# Patient Record
Sex: FEMALE | Race: WHITE
[De-identification: names, ages, dates, MRNs, and addresses within clinical notes are randomized per-mention and may not be internally consistent; named-entity substitution may affect disease eponyms.]

---

## 2017-10-25 ENCOUNTER — HOSPITAL ENCOUNTER (INPATIENT)
Dept: HOSPITAL 92 - ERS | Age: 82
LOS: 3 days | Discharge: NURSING, REHAB OR LONG-TERM CARE | DRG: 956 | End: 2017-10-28
Attending: SURGERY | Admitting: SURGERY
Payer: MEDICARE

## 2017-10-25 VITALS — BODY MASS INDEX: 19.5 KG/M2

## 2017-10-25 DIAGNOSIS — E87.1: ICD-10-CM

## 2017-10-25 DIAGNOSIS — S06.6X9A: ICD-10-CM

## 2017-10-25 DIAGNOSIS — I10: ICD-10-CM

## 2017-10-25 DIAGNOSIS — S72.012A: Primary | ICD-10-CM

## 2017-10-25 DIAGNOSIS — G30.9: ICD-10-CM

## 2017-10-25 DIAGNOSIS — F32.9: ICD-10-CM

## 2017-10-25 DIAGNOSIS — Z88.2: ICD-10-CM
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
## 2017-10-25 DIAGNOSIS — D62: ICD-10-CM

## 2017-10-25 DIAGNOSIS — Z86.73: ICD-10-CM

## 2017-10-25 DIAGNOSIS — G20: ICD-10-CM

## 2017-10-25 DIAGNOSIS — S12.600A: ICD-10-CM

## 2017-10-25 DIAGNOSIS — F02.80: ICD-10-CM

## 2017-10-25 DIAGNOSIS — Z88.8: ICD-10-CM

## 2017-10-25 DIAGNOSIS — Z88.0: ICD-10-CM
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
## 2017-10-25 LAB
ALP SERPL-CCNC: 70 U/L (ref 40–150)
ALT SERPL W P-5'-P-CCNC: 10 U/L (ref 8–55)
ANION GAP SERPL CALC-SCNC: 12 MMOL/L (ref 10–20)
APTT PPP: 28.8 SEC (ref 22.9–36.1)
AST SERPL-CCNC: 14 U/L (ref 5–34)
BASOPHILS # BLD AUTO: 0.1 THOU/UL (ref 0–0.2)
BASOPHILS NFR BLD AUTO: 0.8 % (ref 0–1)
BILIRUB SERPL-MCNC: 0.6 MG/DL (ref 0.2–1.2)
BUN SERPL-MCNC: 11 MG/DL (ref 9.8–20.1)
CALCIUM SERPL-MCNC: 9.2 MG/DL (ref 7.8–10.44)
CHLORIDE SERPL-SCNC: 99 MMOL/L (ref 98–107)
CK SERPL-CCNC: 32 U/L (ref 29–168)
CO2 SERPL-SCNC: 25 MMOL/L (ref 23–31)
CREAT CL PREDICTED SERPL C-G-VRATE: 0 ML/MIN (ref 70–130)
EOSINOPHIL # BLD AUTO: 0.2 THOU/UL (ref 0–0.7)
EOSINOPHIL NFR BLD AUTO: 2.6 % (ref 0–10)
GLOBULIN SER CALC-MCNC: 3.1 G/DL (ref 2.4–3.5)
HCT VFR BLD CALC: 38.4 % (ref 36–47)
LIPASE SERPL-CCNC: 7 U/L (ref 8–78)
LYMPHOCYTES # BLD: 1.1 THOU/UL (ref 1.2–3.4)
LYMPHOCYTES NFR BLD AUTO: 14.4 % (ref 21–51)
MONOCYTES # BLD AUTO: 0.6 THOU/UL (ref 0.11–0.59)
MONOCYTES NFR BLD AUTO: 8.4 % (ref 0–10)
NEUTROPHILS # BLD AUTO: 5.6 THOU/UL (ref 1.4–6.5)
PROTHROMBIN TIME: 13.7 SEC (ref 12–14.7)
RBC # BLD AUTO: 4.33 MILL/UL (ref 4.2–5.4)
TROPONIN I SERPL DL<=0.01 NG/ML-MCNC: (no result) NG/ML (ref ?–0.03)
WBC # BLD AUTO: 7.6 THOU/UL (ref 4.8–10.8)

## 2017-10-25 PROCEDURE — 85025 COMPLETE CBC W/AUTO DIFF WBC: CPT

## 2017-10-25 PROCEDURE — 36415 COLL VENOUS BLD VENIPUNCTURE: CPT

## 2017-10-25 PROCEDURE — 96375 TX/PRO/DX INJ NEW DRUG ADDON: CPT

## 2017-10-25 PROCEDURE — 70450 CT HEAD/BRAIN W/O DYE: CPT

## 2017-10-25 PROCEDURE — 84100 ASSAY OF PHOSPHORUS: CPT

## 2017-10-25 PROCEDURE — G0390 TRAUMA RESPONS W/HOSP CRITI: HCPCS

## 2017-10-25 PROCEDURE — 71010: CPT

## 2017-10-25 PROCEDURE — 84484 ASSAY OF TROPONIN QUANT: CPT

## 2017-10-25 PROCEDURE — 82553 CREATINE MB FRACTION: CPT

## 2017-10-25 PROCEDURE — 83735 ASSAY OF MAGNESIUM: CPT

## 2017-10-25 PROCEDURE — 85610 PROTHROMBIN TIME: CPT

## 2017-10-25 PROCEDURE — 81003 URINALYSIS AUTO W/O SCOPE: CPT

## 2017-10-25 PROCEDURE — 80048 BASIC METABOLIC PNL TOTAL CA: CPT

## 2017-10-25 PROCEDURE — 0SRS0JA REPLACEMENT OF LEFT HIP JOINT, FEMORAL SURFACE WITH SYNTHETIC SUBSTITUTE, UNCEMENTED, OPEN APPROACH: ICD-10-PCS | Performed by: ORTHOPAEDIC SURGERY

## 2017-10-25 PROCEDURE — 72125 CT NECK SPINE W/O DYE: CPT

## 2017-10-25 PROCEDURE — 85730 THROMBOPLASTIN TIME PARTIAL: CPT

## 2017-10-25 PROCEDURE — 80053 COMPREHEN METABOLIC PANEL: CPT

## 2017-10-25 PROCEDURE — 93970 EXTREMITY STUDY: CPT

## 2017-10-25 PROCEDURE — 83690 ASSAY OF LIPASE: CPT

## 2017-10-25 PROCEDURE — 96374 THER/PROPH/DIAG INJ IV PUSH: CPT

## 2017-10-25 RX ADMIN — CLINDAMYCIN PHOSPHATE SCH MLS: 600 INJECTION, SOLUTION INTRAVENOUS at 22:12

## 2017-10-25 RX ADMIN — TROSPIUM CHLORIDE SCH: 20 TABLET, FILM COATED ORAL at 22:12

## 2017-10-25 NOTE — CON
DATE OF CONSULTATION:  10/25/2017

 

REASON FOR CONSULTATION:  Left traumatic subarachnoid hemorrhage and C7 cervical fracture.

 

HISTORY OF PRESENT ILLNESS:  Ms. Rubin is an 83-year-old female who I saw in her room this morning
.  She was found down at her skilled nursing facility in particular it was [place] at [facility]
.  The first night in the facility she fell.  The story is unclear of how she fell; however, the sta
ff found her on the floor and she was in pain and complaining of her right hip hurting.  When she ar
rived at [facility] Emergency Department on a CT of the cervical spine she sustained a C3 v
ertebral body, nondisplaced fracture and a small subarachnoid hemorrhage in the left frontal tempora
l region.  Neurosurgery has been consulted for the small traumatic subarachnoid hemorrhage and C7 no
ndisplaced fracture of the cervical spine.

 

PAST MEDICAL HISTORY:  Parkinson's with parkinsonian dementia and hypertension.  She sees Dr. Valeria Jacome. 

 

PAST SURGICAL HISTORY:  Noncontributory.

 

MEDICATIONS:  Please see medication reconciliation form.  Patient will need to refrain from using an
y blood thinners for approximately 4 weeks postoperatively from the hip surgery and from a neurosurg
ical standpoint with traumatic subarachnoid hemorrhage.

 

REVIEW OF SYSTEMS:  The patient's daughter is at bedside and although she is slightly hypertensive a
t 175/76 she admits to having hypertension that is controlled.  Negative review of systems for any o
ther cardiovascular disease.  The patient also admits to Parkinson's dementia in which she is being 
treated by Dr. Valeria Jacome in Neurology.  All other review of systems are negative, unless stated in 
the above HPI.

 

SOCIAL HISTORY:  The patient denies alcohol, tobacco or illicit drug use.  She is a resident of [facility] that requires total care.  She does ambulate with assistance and essentially hous
ehold walker.

 

PHYSICAL EXAMINATION:

HEENT:  Normocephalic, atraumatic.  Hearing intact.  Moist mucous membranes.  Trachea midline.

EYES:  Pupils are equal and reactive to light.  Extraocular muscles are intact.  Sclerae is white, n
onicteric.

CARDIOVASCULAR:  The patient has a regular rate and rhythm, normal S1, S2 heart sounds, has  no dist
al cyanosis or clubbing noted.

RESPIRATORY:  Patient has bilateral symmetric chest rise.  Appears to be in no shortness of breath. 
 All lung fields are clear.

NEUROLOGIC:  Cranial nerves II-XII are grossly intact.  Her speech is fluent and she answers my ques
tions appropriately.  She is slightly confused; however, this is baseline per family.

MUSCULOSKELETAL:  The patient has tenderness to palpation of the right hip and demonstrates shorteni
ng and external rotation of the left lower extremity compared to the right.  She is neurovascularly 
intact.

 

IMAGING/STUDIES:  Mentioned in the HPI.

 

IMPRESSION:  Ms. Rubin is an 83-year-old female who is in 3323 who presented with a left displaced
 shortened femoral neck fracture, a C7 nondisplaced stable vertebral fracture and a small traumatic 
subarachnoid hemorrhage with advanced Parkinson's and hypertension.

 

PLAN:  From neurosurgical standpoint we will have patient's head of bed at 30 degrees.  We will do n
euro checks q.4 hours.  We will keep the patient off of blood thinners for approximately 4 weeks and
 get regular ultrasounds of bilateral lower extremities to ensure that she does not have a DVT.  If 
DVT does develop at some point during her hospitalization the patient will need a filter placed.

 

If there are any further questions, please feel free to contact Neurosurgery.

## 2017-10-25 NOTE — CT
PRELIMINARY REPORT/[facility]/EMERGENCY AFTER

HOURS PROCEDURE:

 

EXAM:

CT Cervical Spine Without Intravenous Contrast

 

EXAM DATE/TIME:

Exam ordered 10/25/2017 5:50 AM

 

CLINICAL HISTORY:

83 years old, female; Injury or trauma; Fall; Initial encounter; Abrasion

 

TECHNIQUE:

Axial computed tomography images of the cervical spine without intravenous contrast.

Coronal and sagittal reformatted images were created and reviewed.

 

COMPARISON:

No relevant prior studies available.

 

FINDINGS:

Vertebrae: No acute cervical spine fracture is identified.

Discs/spinal canal/neural foramina: The cervical spine demonstrates mild degenerative changes at mul
tiple levels. No spinal canal stenosis.

Soft tissues: Normal.

Thyroid: The thyroid gland is normal.

Lung apices: The visualized portions of the lung apices are normal.

 

IMPRESSION:

No acute cervical spine fracture is identified.

 

Thank you for allowing us to participate in the care of your patient.

 

Dictated and Authenticated by: Albin Senior MD

10/25/2017 6:14 AM Central Time ([place] \T\ Ayesha)

 

 

                           FINAL REPORT

 

CERVICAL SPINE CT WITHOUT CONTRAST:

 

HISTORY:

An 83-year-old female.  Fall.  Injury.  Pain.

 

COMPARISON:

09/03/2017

 

TECHNIQUE:

A cervical spine CT is performed without contrast.  Reformatted images are submitted for interpretat
ion.

 

FINDINGS:

This report is in disagreement with the preliminary report by [facility].  When compared to the prior exami
nation, there is interval loss of vertebral body height at T1 and T2.  Additionally, there is a subt
le lucency involving the C7 vertebral body, suggesting a nondisplaced fracture.  Clinical correlatio
n and further evaluation with MRI is recommended.

 

IMPRESSION:

1.  Nondisplaced C7 fracture.

2.  Mild loss of vertebral body height at T1 and T2 since the prior exam, correlate for fracture.  C
onsider MRI of the C spine and include upper T spine.

 

The results of the study were discussed with Dr. Henderson on 10/25/2017 at 8:18 a.m.

 

CODE CR

 

POS: [facility]

## 2017-10-25 NOTE — RAD
RADIOGRAPH LEFT HIP TWO VIEWS:

 

Date: 10-25-17 

Time: 5:30 a.m.

 

History: 

83-year-old female status post fall, left hip pain. 

 

FINDINGS: 

There is a transverse fracture of the subcapital portion of the femoral neck, with at least half bon
e width superolateral displacement of the distal fragment, and at least mild overlap of fracture fra
gments. Diffuse osteopenia. No dislocation. 

 

IMPRESSION: 

Acute, traumatic, displaced, closed, subcapital left femoral neck fracture. 

 

POS: [facility]

## 2017-10-25 NOTE — RAD
AP AND FROGLEG VIEWS LEFT HIP

10/25/17

 

HISTORY: 

Status post left femoral neck fracture post hip replacement. 

 

AP and frogleg views left hip is obtained. 

 

FINDINGS/IMPRESSION:  

A left hip unipolar arthroplasty is seen. The fractured femoral head and portions of the femoral nec
k have been removed. Left hip arthroplasty has been placed. No acute fractures or bony lesions seen.
 

 

POS: [facility]

## 2017-10-25 NOTE — CON
DATE OF CONSULTATION:  10/25/2017

 

REQUESTING PHYSICIAN:  Dr. Jhonatan Morrison.

 

CONSULTING PHYSICIAN:  Dr. Joe Degroot.

 

REASON FOR CONSULTATION:  Left hip femoral neck fracture.

 

BRIEF CLINICAL HISTORY:  Marie is an 83-year-old white female who was brought to [facility] via EMS, after she was found down at her skilled nursing facility.  She just recently 
transferred from [place] to [facility]  This was her first night in this particular facility.  
Of note, she also sustained a C7 vertebral body nondisplaced fracture and a small subarachnoid hemor
rhage as a result of the fall.  Our service has been consulted for the left hip fracture and definit
shahana orthopedic management thereof.

 

PAST MEDICAL HISTORY:  Significant for advanced Parkinson's with parkinsonian dementia and hypertens
ion.

 

PAST SURGICAL HISTORY:  Noncontributory.

 

MEDICATIONS:  Please see medication reconciliation form.

 

REVIEW OF SYSTEMS:  The patient's daughter is at the bedside and she gives a very detailed history o
f the patient's medical history to include her Parkinson's dementia and her physical inability; quach
fransisco, other than that, she has a little high blood pressure and review of systems is negative for any
 other significant cardiovascular disease or illness.

 

SOCIAL HISTORY:  There is no ethanol, tobacco, or illicit drug abuse.  The patient is a nursing home
 resident and requires total care.  She does ambulate with assistance and is essentially a household
 walker.

 

PHYSICAL EXAMINATION:

GENERAL:  This is an elderly white female, appearing her stated age.  She is frail.  Does not respon
d much for the examiner, but does converse with her daughters.

VITAL SIGNS:  Stable.  Grossly nonfocal.

MUSCULOSKELETAL:  Visual inspection of the left lower extremity demonstrates her to have shortening 
in external rotation of the left lower extremity relative to the right.  She is neurovascularly inta
ct on this, and exam of the hips not performed due to known underlying displaced shortened femoral n
ananya fracture.

 

IMAGING STUDIES:  Two-view left hip demonstrates a shortened, displaced, midcervical femoral neck fr
acture.  CT examination of the cervical spine demonstrates a stable middle column involvement of the
 C7 vertebra without displacement or retropulsion.

 

IMPRESSION:

1.  Left hip displaced shortened femoral neck fracture.

2.  C7 nondisplaced stable vertebral fracture.

3.  Small traumatic subarachnoid hemorrhage.

4.  Advanced Parkinson's with dementia.

5.  Hypertension.

 

PLAN:

1.  The patient will be admitted to the trauma team.

2.  The risks, benefits, options, alternatives, and rationale for proceeding with a press-fit left h
ip hemiarthroplasty has been explained in great detail to the patient and her family, who accompanie
s her and they are ready to proceed.  All questions were answered and no guarantee of outcome has be
en stated or implied.

3.  Please see orders.

## 2017-10-25 NOTE — HP-2
DATE OF SERVICE:  10/25/2017

 

CHIEF COMPLAINT:  Fall from ground level.

 

HISTORY OF PRESENT ILLNESS:  This is an 83-year-old  female who was 
brought to [facility] via EMS after she was found down 
at her skilled nursing facility.  She just recently transferred from [place] 
to [facility].  This was her first night in this particular facility.  The 
patient reports that she remembers the fall, but the fall was unwitnessed and 
she is a poor historian as she has Parkinsonism with dementia.  She does 
endorse pain above the eye on the left side as well as left hip pain.  The 
patient's daughter was at bedside and assisted with providing information 
regarding the patient's past medical history.  Of note, not a lot of 
information is known about this fall as it was unwitnessed.  EMS was called 
immediately after the patient was found down at her facility.  A hip x-ray, 
chest x-ray, cervical spine CT and brain CT were performed.  The patient was 
also given morphine 2 mg for pain relief.

 

PAST MEDICAL HISTORY:

1.  Parkinson's disease.

2.  Parkinsonian dementia.

3.  Hypertension.

4.  History of transient ischemic attacks.

5.  Depression.

 

PAST SURGICAL HISTORY:  Not available at the time of evaluation. Will  try to 
obtain this information from the family if possible.

 

ALLERGIES:

1.  HYDRALAZINE.

2.  PENICILLIN G SODIUM.

3.  PENICILLIN.

4.  SULFA.

5.  SULFAMETHOXAZOLE. 

 

MEDICATIONS:

1.  Seroquel 50 mg oral daily.

2.  Centrum Silver 1 tablet oral once a day.

3.  Aspirin 81 mg oral daily.

4.  Detrol 1 tablet oral once a day.

5.  Sinemet 2 tablets oral 3 times a day.

6.  Lisinopril 1 tablet oral once a day.

7.  Celexa 1 tablet oral once a day.

8.  Neupro 1 transdermal patch q.24h.

9.  Tylenol extra strength 500 mg oral q.6h. p.r.n.

 

FAMILY HISTORY:  Noncontributory.

 

SOCIAL HISTORY:  There is no alcohol, tobacco or illicit drug abuse per family.
  The patient is a nursing home resident and requires total care.  She does 
ambulate with assistance.  

 

REVIEW OF SYSTEMS:  The patient is unable to provide a reliable review of 
systems secondary to her mental status.  The patient's daughter was at bedside 
and able to tell us that the patient has Parkinsonian dementia.  The patient 
did report that she had some pain above her left eye as well as left hip pain.  
Other than the above-mentioned findings review of systems was insignificant. 

 

PHYSICAL EXAMINATION:

VITAL SIGNS:  Blood pressure 175/76, pulse 83, respiratory rate 18, temperature 
98.3, oxygen saturation 95% on 2 liters of oxygen.

GENERAL:  The patient is alert, in no acute distress.  She is well developed 
and appropriately interactive.  She will answer questions for examiner.

HEENT:  Eyes; pupils equally round, reactive to light and accommodation.  
Extraocular muscles intact.  Conjunctivae within normal limits.  

NECK:  Supple.

CARDIOVASCULAR:  Irregularly irregular rhythm.  No murmur detected.  Radial 
pulses 2+.

RESPIRATORY:  Normal respiratory effort, equal rise and fall of the chest.

LUNGS:  Clear to auscultation.

ABDOMEN:  Soft, nontender to palpation.  Bowel sounds positive in all 4 
quadrants.

EXTREMITIES:  Patient tender to palpation over the left hip.

NEUROLOGIC:  Patient neurovascularly intact on this exam.  GCS 15.

 

LABORATORY DATA:  

1.  CBC:  White blood cell count 10.6, hemoglobin 12.4, hematocrit 38.4, 
platelet 247.

2.  CMP:  Sodium 132, potassium 4.1, chloride 99, bicarb 25, BUN 11, creatinine 
0.62, calcium 9.2, total bilirubin 0.6, AST 14, ALT 10, alkaline phosphatase 70 
with a total protein 6.8, albumin 3.7.

3.  Creatinine kinase 13.

4.  CK-MB 1.7, troponin less than 0.01.

5.  Coag studies:  PT 32.7, INR 1.0, PTT 28.8.

6.  Urine negative.

7.  Brain CT; acute left frontal subarachnoid hemorrhage.

8.  Cervical spine CT:  C7 vertebral body fracture identified.  

9.  Chest x-ray:  Cardiomegaly.

10.  Hip x-ray acute traumatic displaced closed subcapital left femoral neck 
fracture.

 

ASSESSMENT AND PLAN:  This 83-year-old female status post ground level fall.

1.  Status post ground level fall.

2.  C7 vertebral body fracture.

3.  Acute traumatic displaced closed subcapital left femoral neck fracture.

4.  Subarachnoid hemorrhage.

 

PLAN:  The  patient has been admitted to the Trauma Service.  The patient will 
be placed on surgical floor as inpatient.  GREGORY Hurtado has been consulted.  
The patient is likely to undergo surgical procedure for left hip fracture this 
afternoon.  The patient was placed in a neck brace for a C7 vertebral body 
fracture.  We will continue to follow patient. 

 

Dr. Morrison saw and evaluated patient.

 

[facility]

## 2017-10-25 NOTE — CT
PRELIMINARY REPORT/[facility]/EMERGENCY AFTER

HOURS PROCEDURE:

 

EXAM:

CT Head Without Intravenous Contrast

 

EXAM DATE/TIME:

Exam ordered 10/25/2017 5:53 AM

 

CLINICAL HISTORY:

83 years old, female; Injury or trauma; Fall; Initial encounter; Abrasion; Not specified; Patient HX
: Er 8; Fall; F83 presented to ed by ems C/O l hip pain S/P mechanical fall due to failure with pt's
 walker. Ems arrived on scene and found pt laying on her l side after the fall. Pt denies loc. Ems n
otes pt has not had any of her medications today. Pt HX of dementia.

 

TECHNIQUE:

Axial computed tomography images of the head/brain without intravenous contrast.

 

COMPARISON:

CT head from 9/3/17.

 

FINDINGS:

Brain: There is hyperattenuation involving the sulci of the LEFT frontal lobe suggestive of subarach
noid hemorrhage. No significant white matter disease.

Ventricles: Normal. No ventriculomegaly.

Bones/joints: Normal. No acute fracture.

Soft tissues: Normal.

Sinuses: Unremarkable as visualized. No acute sinusitis.

Mastoid air cells: Unremarkable as visualized. No mastoid effusion.

 

IMPRESSION:

Acute LEFT frontal subarachnoid hemorrhage as above.

 

THIS REPORT CONTAINS FINDINGS THAT MAY BE CRITICAL TO PATIENT CARE. The findings were verbally commu
nicated via telephone conference with Dr. Henderson at 6:27 AM CDT on 10/25/2017. The findings were ackno
wledged and understood.

 

Thank you for allowing us to participate in the care of your patient.

 

Dictated and Authenticated by: Albin Senior MD

10/25/2017 6:28 AM Central Time ([place] \T\ Ayesha)

 

 

                           FINAL REPORT

 

CT BRAIN WITHOUT CONTRAST

 

I agree with the preliminary report given by Dr. Albin Senior of [facility]. 

 

POS: [facility]

## 2017-10-25 NOTE — ULT
HISTORY: 

Immobility. Patient with right femoral fracture as well as hip surgery today involving the left hip.
 

 

BILATERAL LOWER EXTREMITY VENOUS DOPPLER ULTRASOUND EVALUATION

10/25/17

 

Multiple longitudinal and transverse images of the right and left lower extremity venous systems wer
e obtained using a multihertz linear array transducer. Real time, color flow, and spectral waveform 
doppler analysis used to evaluate the right and left lower extremity venous systems. 

 

Images demonstrate no evidence of acute or old clot seen in the right or left common femoral, superf
icial femoral, femora profunda, popliteal, posterior tibial veins, post trifurcation veins, and grea
ter saphenous veins. 

 

IMPRESSION:  

No evidence of right or left lower extremity deep venous thrombosis. 

 

POS: [facility]

## 2017-10-25 NOTE — RAD
PORTABLE CHEST ONE VIEW:

 

Date: 10-25-17 

Time: 5:31 a.m.

 

History: Fall, left hip pain. Dysmnesia. Hypertension. 

 

FINDINGS:

The heart size is enlarged. The aorta is tortuous. The lungs are well expanded without confluent are
as of consolidation, pneumothorax, becky pulmonary edema or pleural effusions. 

 

IMPRESSION: 

Cardiomegaly. 

 

POS: [facility]

## 2017-10-26 LAB
ANION GAP SERPL CALC-SCNC: 11 MMOL/L (ref 10–20)
ANION GAP SERPL CALC-SCNC: 9 MMOL/L (ref 10–20)
BASOPHILS # BLD AUTO: 0 THOU/UL (ref 0–0.2)
BASOPHILS NFR BLD AUTO: 0.4 % (ref 0–1)
BUN SERPL-MCNC: 15 MG/DL (ref 9.8–20.1)
BUN SERPL-MCNC: 17 MG/DL (ref 9.8–20.1)
CALCIUM SERPL-MCNC: 8.4 MG/DL (ref 7.8–10.44)
CALCIUM SERPL-MCNC: 8.5 MG/DL (ref 7.8–10.44)
CHLORIDE SERPL-SCNC: 92 MMOL/L (ref 98–107)
CHLORIDE SERPL-SCNC: 93 MMOL/L (ref 98–107)
CO2 SERPL-SCNC: 24 MMOL/L (ref 23–31)
CO2 SERPL-SCNC: 27 MMOL/L (ref 23–31)
CREAT CL PREDICTED SERPL C-G-VRATE: 36 ML/MIN (ref 70–130)
CREAT CL PREDICTED SERPL C-G-VRATE: 37 ML/MIN (ref 70–130)
EOSINOPHIL # BLD AUTO: 0 THOU/UL (ref 0–0.7)
EOSINOPHIL NFR BLD AUTO: 0.5 % (ref 0–10)
HCT VFR BLD CALC: 32.2 % (ref 36–47)
LYMPHOCYTES # BLD: 0.6 THOU/UL (ref 1.2–3.4)
LYMPHOCYTES NFR BLD AUTO: 9.3 % (ref 21–51)
MONOCYTES # BLD AUTO: 0.5 THOU/UL (ref 0.11–0.59)
MONOCYTES NFR BLD AUTO: 8.1 % (ref 0–10)
NEUTROPHILS # BLD AUTO: 5.3 THOU/UL (ref 1.4–6.5)
RBC # BLD AUTO: 3.59 MILL/UL (ref 4.2–5.4)
WBC # BLD AUTO: 6.5 THOU/UL (ref 4.8–10.8)

## 2017-10-26 RX ADMIN — CLINDAMYCIN PHOSPHATE SCH MLS: 600 INJECTION, SOLUTION INTRAVENOUS at 05:06

## 2017-10-26 RX ADMIN — CLINDAMYCIN PHOSPHATE SCH MLS: 600 INJECTION, SOLUTION INTRAVENOUS at 15:02

## 2017-10-26 RX ADMIN — Medication SCH: at 09:25

## 2017-10-26 RX ADMIN — TROSPIUM CHLORIDE SCH MG: 20 TABLET, FILM COATED ORAL at 21:27

## 2017-10-26 RX ADMIN — TROSPIUM CHLORIDE SCH: 20 TABLET, FILM COATED ORAL at 12:07

## 2017-10-26 NOTE — PRG
DATE OF SERVICE:  10/26/2017

 

SUBJECTIVE:  The patient is an 83-year-old female who is postop day #1 from a left hip hemiarthropla
sty.  She has been sleeping quite a bit today according to her daughters who are at the bedside.

 

OBJECTIVE:

VITAL SIGNS:  Temperature 97.7, pulse 62, respiratory rate 16, O2 saturation on room air is 94%.  Bl
ood pressure is 137/71.

GENERAL:  Currently she is drowsy and resting comfortably.  

EXTREMITIES:  Incision is clean and closed without erythema, no strike through.  

 

LABORATORY:  Hemoglobin and hematocrit 10.5 and 32.2.

 

ASSESSMENT:

1.  An 83-year-old white female postop day #1 left hip hemiarthroplasty secondary to femoral neck fr
acture.

2.  Mild postoperative hemorrhagic anemia.

3.  Advanced Parkinson's with dementia.

4.  Hypertension.

5.  Subarachnoid hemorrhage secondary to fall.

 

PLAN:  Continue current management, mechanical DVT prophylaxis.

## 2017-10-26 NOTE — CT
PRELIMINARY REPORT/[facility]/EMERGENCY AFTER

HOURS PROCEDURE:

 

EXAM:

CT Head Without Intravenous Contrast

 

EXAM DATE/TIME:

Exam ordered 10/26/2017 4:42 AM

 

CLINICAL HISTORY:

83 years old, female; Condition or disease; Other: Sah; Patient HX: F/u sah

 

TECHNIQUE:

Axial computed tomography images of the head/brain without intravenous contrast.

 

COMPARISON:

CT Brain WO Con 2017-10-25 05:53

 

FINDINGS:

Brain: Redemonstrated is hyperdensity overlying sulci of the LEFT frontal lobe consistent with subar
achnoid hemorrhage. This finding is stable from one day prior. There is no significant mass effect o
r midline shift. No significant white matter disease.

Ventricles: Normal. No ventriculomegaly.

Bones/joints: Normal. No acute fracture.

Soft tissues: Normal.

Sinuses: Unremarkable as visualized. No acute sinusitis.

Mastoid air cells: Unremarkable as visualized. No mastoid effusion.

 

IMPRESSION:

Stable LEFT frontal subarachnoid hemorrhage.

 

Thank you for allowing us to participate in the care of your patient.

 

Dictated and Authenticated by: Albin Senior MD

10/26/2017 5:10 AM Central Time ([place] \T\ [place]yes[place])

 

 

FINAL REPORT

CT BRAIN WITHOUT CONTRAST:

 

Date:  10/26/17 

 

FINDINGS/IMPRESSION: 

I agree with the preliminary report given by Dr. Albin Senior of [facility]. 

 

 

POS: [facility]

## 2017-10-26 NOTE — PRG
DATE OF SERVICE:  10/26/2017

 

Ms. Rubin is an 83-year-old female who I saw in her room this morning.  She had a left-sided front
oparietal traumatic subarachnoid hemorrhage that is very small yesterday.  Repeat imaging of the bra
in this morning showed that it did not blossom in size.  Her vital signs have been stable overnight 
and there are no acute new neurologic deficits on exam.  She still has a left femoral neck fracture 
that Orthopedic Surgery is caring for.  From a neurological standpoint, it is doubtful that the suba
rachnoid hemorrhage will blossom in the future and thus far it has begun to resorb.  

 

Please contact Neurosurgery if there are any further questions.

## 2017-10-26 NOTE — PRG
DATE OF SERVICE:  10-26/2017

 

I personally interviewed and examined the patient and reviewed images and agree with documentation malinda Jeffrey PA-C, dated 10/25/2017.

 

Briefly Ms. Marie Rubin is an 83-year-old woman, the mother-in-law of one of our emergency room doc
tors, who fell yesterday.  She suffered a hip fracture.  She struck her head on the left forehead an
d a CT showed a small amount of subarachnoid blood and there was a suggestion on 1 plane of the cerv
ical spine CT scan that C7 may have a fracture in it.

 

Overnight, Ms. Rubin has rested comfortably in her hospital bed.  She is not complaining of neck p
ain to me this morning.  She understands may speech, but only if I speak very slowly and loudly.  Th
en, she follows commands quite well.  She tells me that her brain feels a little fuzzy from the pain
 medicine that she is getting.  On examination, I think she has good cranial nerve function.  I thin
k she has reasonable strength in her upper extremities, the lower extremity examination is complicat
ed by her hip fracture, but otherwise she is neurologically intact there as well.  I reviewed CT exa
minations of the brain showing traumatic subarachnoid hemorrhage in one of the sulci of the left fro
ntal lobe which has improved in a followup scan.  I reviewed the CT scan of the cervical spine on th
e axial views (which are not axial given the patient's accentuated thoracic kyphosis and compensator
y exaggerated cervical lordosis).  I see this only in 1 plane and she is completely nontender in the
 cervical spine.  I am not convinced that this is a fracture at all.  The safest thing to do would b
e to wear a collar for a couple weeks and have her come back to clinic, but she is already out of he
r collar.  She has normal range of motion and is without pain and the case could be made for leaving
 the collar off.  The safest thing to do would be to get an MRI scan and make sure there is no stir 
signal change within the body of C7 vertebral segment and if there is not,  then I will all be reass
ured that there is no fracture whatsoever.  I think the likelihood of a fracture is low.

 

Ms. Rubin has some traumatic subarachnoid hemorrhage.  I think she should not use blood thinners f
or management of her hip, but the ultrasound of the lower extremities should be considered or a temp
orary placement of an IVC filter.

 

We will follow her case in the hospital.

## 2017-10-26 NOTE — OP
DATE OF SURGERY:  10/25/2017

 

PREOPERATIVE DIAGNOSIS:  Left femoral neck fracture, displaced.

 

POSTOPERATIVE DIAGNOSIS:  Left femoral neck fracture, displaced.

 

SURGICAL PROCEDURE:  Left hip hemiarthroplasty.

 

ANESTHESIA:  General.

 

SURGEON:  Joe Degroot M.D.

 

ASSISTANT:  Bhupinder Cruz PA-C

 

IMPLANTS:  DePuy Craven system was used with a size 4 stem, a 42 mm diameter bipolar head and +1.5 n
ananya and head component.

 

ESTIMATED BLOOD LOSS:  200 mL.

 

COMPLICATIONS:  None.

 

DRAINS:  None.

 

SPECIMEN:  None.

 

OUTCOME:  Satisfactory.

 

INDICATIONS:  Ms. Rubin is a pleasant 83-year-old lady who has a history of Parkinson's disease.  
She was just recently discharged from a skilled nursing facility at [place] and unfortunately upon
 transfer to an assisted living, she sustained a ground level fall.  X-rays in the emergency room de
monstrated a displaced femoral neck fracture and as such admitted to the Trauma Service and orthoped
ic consultation requested.  Today, I have discussed with family and patient, the nature of the injur
y as well as treatment options.  We have discussed options such as cannulated screw fixation, total 
hip arthroplasty and hemiarthroplasty.  At this time, we are proceeding with hemiarthroplasty.  I be
lieve all questions have been answered.  Risks and benefits have been discussed.

 

PROCEDURE IN DETAIL:  The patient was brought to the operating room and a timeout performed and then
 general anesthesia induced.  The patient was then positioned in right lateral decubitus position on
 the operating room table.  A sterile prep and drape was then performed of the left lower extremity.
  A curvilinear incision was made centered over the greater trochanter.  After the skin was sharply 
incised, dissection was carried down bluntly to the underlying gluteal muscle and tensor fascia.  Th
is structure was incised in line with the skin incision and then held open with a Charnley retractor
.  An elevator was swept under the abductors and this was reflected anteriorly and then the piriform
is tendon identified.  This was released and tagged for eventual reattachment.  A T-capsulotomy was 
then performed with edges of the capsule also tagged.  The femoral head was then removed with a cork
screw type device.  Next, a femoral neck osteotomy was performed using an oscillating saw with the c
ut approximately 1 cm above the lesser trochanter.  Once performed, the calcar was inspected and fou
nd to be free of any fracture lines.  Next, a box chisel was used to obtain a starting point for ins
trumentation of the proximal femur.  This was followed by a T handle awl and then a lateralizing nani
andrew.  Progressive T handle walls were passed down the shaft of the femur to the point where it stopp
ed between size 4 and 5.  As such, broaching was started at size 2 and continued up to a size 4, whi
ch gave excellent fit and fill of the proximal femur.  This trial component was left in place and th
en a 41 mm bipolar head with a standard neck was applied to the stem and then the hip reduced.  This
 restored leg length and provided excellent stability.  As such, all trial components were then luigi
agata.  The hip was then irrigated with 3 liters of normal saline using Pulsavac.  Care was taken that
 there were no bony fragments left in the acetabulum.  Next, the final Craven 4 stem was inserted in
 the proximal femur.  Once a good press fit was achieved, the bipolar head applied.  The hip was the
n reduced and found to have excellent stability.  When it was brought up into the 90 degree flexion 
with neutral abduction, the leg could be brought into nearly 70 degrees of internal rotation before 
subluxation was encountered.  The hip was then brought back to a neutral position and then wound jonathan
sure performed.  The capsule was reapproximated with #2 Vicryl.  The piriformis reattached with #2 V
icryl.  The tensor fascia and gluteal fascia was reapproximated with #2 Vicryl as well.  A 0 Vicryl 
was used for Caryn's fascia followed by 2-0 Vicryl and staples for the skin.  A Xeroform gauze and 
tape dressing was applied to the thigh and then patient was transferred to recovery room in stable c
ondition.  There were no complications.  She tolerated the procedure well.

## 2017-10-27 LAB
ANION GAP SERPL CALC-SCNC: 7 MMOL/L (ref 10–20)
BASOPHILS # BLD AUTO: 0 THOU/UL (ref 0–0.2)
BASOPHILS NFR BLD AUTO: 0.4 % (ref 0–1)
BUN SERPL-MCNC: 13 MG/DL (ref 9.8–20.1)
CALCIUM SERPL-MCNC: 8.4 MG/DL (ref 7.8–10.44)
CHLORIDE SERPL-SCNC: 93 MMOL/L (ref 98–107)
CO2 SERPL-SCNC: 25 MMOL/L (ref 23–31)
CREAT CL PREDICTED SERPL C-G-VRATE: 48 ML/MIN (ref 70–130)
EOSINOPHIL # BLD AUTO: 0.3 THOU/UL (ref 0–0.7)
EOSINOPHIL NFR BLD AUTO: 4.6 % (ref 0–10)
HCT VFR BLD CALC: 28.4 % (ref 36–47)
LYMPHOCYTES # BLD: 0.6 THOU/UL (ref 1.2–3.4)
LYMPHOCYTES NFR BLD AUTO: 11.6 % (ref 21–51)
MAGNESIUM SERPL-MCNC: 1.6 MG/DL (ref 1.6–2.6)
MONOCYTES # BLD AUTO: 0.6 THOU/UL (ref 0.11–0.59)
MONOCYTES NFR BLD AUTO: 10.7 % (ref 0–10)
NEUTROPHILS # BLD AUTO: 4 THOU/UL (ref 1.4–6.5)
RBC # BLD AUTO: 3.18 MILL/UL (ref 4.2–5.4)
WBC # BLD AUTO: 5.5 THOU/UL (ref 4.8–10.8)

## 2017-10-27 RX ADMIN — TROSPIUM CHLORIDE SCH MG: 20 TABLET, FILM COATED ORAL at 09:02

## 2017-10-27 RX ADMIN — Medication PRN ML: at 09:03

## 2017-10-27 RX ADMIN — Medication SCH TAB: at 09:02

## 2017-10-27 RX ADMIN — Medication SCH: at 09:26

## 2017-10-27 RX ADMIN — TROSPIUM CHLORIDE SCH: 20 TABLET, FILM COATED ORAL at 23:00

## 2017-10-27 NOTE — PRG-2
DATE OF SERVICE:  10/26/2017

 

SUBJECTIVE:  Ms. Rubin is an 83-year-old  female postoperative day #1
, status post left hip hemiarthroplasty secondary to femoral neck fracture.  
The patient was doing well this morning.  She did not verbalize much during the 
encounter; however, this is her normal secondary to her parkinsonian dementia.  
She had a friend at bedside who stated that she had been doing pretty well.  
Her pain seemed adequately controlled.  The patient had not eaten since the 
surgery.  Patient has been sleeping quite a bit more than usual.  The patient 
is alert and awake. 

 

OBJECTIVE:

VITAL SIGNS:  Blood pressure 131/68, pulse was 70, respiratory rate 16, T-max 
98.4, oxygen saturation 93% on 2 liters.

HEENT:  Head is normocephalic and atraumatic.

RESPIRATORY:  Equal rise and fall of chest.  No labored breathing noted.

EXTREMITIES:  Incision is clean and closed without erythema.

NEUROLOGIC:  No focal deficits noted on exam.

 

LABORATORY DATA:  CBC shows a white blood cell count 6.5, hemoglobin 10.5, 
hematocrit 32.2, and platelet 220.  CMP shows a sodium of 125, potassium of 4.1
, chloride of 93, bicarbonate of 27, BUN of 17, creatinine of 0.84, and a 
glucose of 111.

 

ASSESSMENT:

1.  An 83-year-old  female postoperative day #1, status post left hip 
hemiarthroplasty secondary to femoral neck fracture.

2.  Mild postoperative hemorrhagic anemia.

3.  Advanced Parkinson's Disease with dementia.

4.  Hypertension.

5.  Subarachnoid hemorrhage secondary to fall.

 

PLAN:  We will continue current management.  Of note, the patient's sodium 
dropped from 132-123 this a.m.  Repeat BMP showed that sodium was 125.  Patient 
was given 10 mg IV push of Lasix.  We will need to evaluate free water intake 
and promote salt consumption.  Additionally, patient has been fluid restricted. 
Will recheck sodium in the AM. The patient is currently on mechanical 
prophylaxis.  She is high risk for pharmacological prophylaxis secondary to her 
small subarachnoid hemorrhage.  Will add Ensure to the patient's diet to help 
with nutrition.  We will monitor her sodium with BMP and continue with the 
current plan of action.

 

Patient was seen and evaluated by Dr. Jhonatan Morrison.

 

CHRIS

## 2017-10-27 NOTE — PRG
DATE OF SERVICE:  10/27/2017

 

SUBJECTIVE:  The patient is postop day #2 from her hip surgery.  Her pain is being managed appropria
tely.  She has no complaints at this time.  She remains confused due to her Alzheimer's.  No complai
nts at this time.

 

OBJECTIVE:

VITAL SIGNS:  Currently temperature 98.1, heart rate of 80, respiratory rate of 20, sats are 94% and
 blood pressure 115/57.

GENERAL:  No acute distress.

LUNGS:  Clear bilaterally on auscultation.

CARDIOVASCULAR:  S1, S2, regular rate and rhythm.

ABDOMEN:  Soft, nontender and nondistended.

PELVIS:  Intact.  Some ecchymosis near the surgical site.

EXTREMITIES:  No edema.  Positive pulses.

 

LABORATORY RESULTS:  WBC:  White count 5.5, hemoglobin 9.3, hematocrit 28.4 and platelet count 197. 
 Chemistries:  Sodium of 121, potassium 3.8, chloride 93, bicarb 25, BUN 13, creatinine 0.63 and glu
cose 93.

 

ASSESSMENT:

1.  Status post fall.

2.  Subarachnoid hemorrhage.

3.  Femoral neck fracture.

 

PLAN:  The patient is postop day #2 from her hip fixation.  She is doing well from a pain standpoint
, has not mobilized very well with physical therapy at this time yet, but has been working with them
.

 

Subarachnoid hemorrhage, stable.  Dr. Toussaint has recommended to do a temporary IV _____ and no an
ticoagulation at this time and surveillance ultrasounds of the bilateral lower extremities.  On the 
issue of DVT prophylaxis, the conversations need with the patient's family at bedside as well as ove
r the phone with their family member who is a physician.  Dr. Morrison at that time on rounds had this 
conversation with him and the risks and benefits with having an IVC filter placement.  We were recom
mending IVC filter placement in lieu of just SCDs of Nicolas hose stockings.  The family opted against I
VC filter placement and would like to continue the round of SCDs and Nicolas hose stockings at this time
.  They thoroughly understand the risks and benefits according to that and in agreement of their cur
rent treatment plan.  Hyponatremia.  We will continue to monitor tomorrow.  We have added salt table
ts b.i.d.  We will review her chemistry in the a.m., most likely discharge to [place] tomorrow tato guido.  The patient has been seen at bedside by Dr. Jhonatan Morrison and agrees with the above plan.

## 2017-10-27 NOTE — PRG
DATE OF SERVICE:  10/27/2017

 

Ms. Rubin is starting her third hospital day with us.  She suffered a fall with hip fracture and t
raumatic subarachnoid hemorrhage which led to her admission on the 25th.  Overnight, the nurse did n
ot report any significant events.  Her sitter in her room confirms this. 

 

I reviewed her vital signs and they looks stable to me.  Blood pressures have been in the 100s to 15
0s.  There is no fever that I can see.

 

Ms. Rubin's neurological examination reveals she is arousable.  To loud voice she wakes up to her 
name.  She tells me where she is.  She knows her relationship to Dr. Carroll Roa and identifies dulce bailey as her son-in-law.  She can follow commands.  However, she does not carry on long conversations an
d it quickly becomes evident that she is confused.  I do not find any new.  cranial neuropathies or 
motor or sensory deficits.

 

Ms. Rubin has already had surgery for hip fracture.  We will get an ultrasound of lower extremitie
s to make sure she is not developing any DVT.  I do not believe her traumatic subarachnoid hemorrhag
e will cause any mass effect or result in any need for surgical intervention.  She may have decremen
t in her cognitive function.  She had a head trauma severe enough to cause a subarachnoid hemorrhage
.  Hopefully, her care facility would be able to continue management.  The Neurosurgery Team will co
kiet to visit Ms. Rubin while she is in the hospital.

## 2017-10-28 VITALS — TEMPERATURE: 98.3 F | DIASTOLIC BLOOD PRESSURE: 84 MMHG | SYSTOLIC BLOOD PRESSURE: 128 MMHG

## 2017-10-28 LAB
ANION GAP SERPL CALC-SCNC: 10 MMOL/L (ref 10–20)
BASOPHILS # BLD AUTO: 0 THOU/UL (ref 0–0.2)
BASOPHILS NFR BLD AUTO: 0.6 % (ref 0–1)
BUN SERPL-MCNC: 14 MG/DL (ref 9.8–20.1)
CALCIUM SERPL-MCNC: 9.1 MG/DL (ref 7.8–10.44)
CHLORIDE SERPL-SCNC: 97 MMOL/L (ref 98–107)
CO2 SERPL-SCNC: 27 MMOL/L (ref 23–31)
CREAT CL PREDICTED SERPL C-G-VRATE: 50 ML/MIN (ref 70–130)
EOSINOPHIL # BLD AUTO: 0.3 THOU/UL (ref 0–0.7)
EOSINOPHIL NFR BLD AUTO: 5.8 % (ref 0–10)
HCT VFR BLD CALC: 30.1 % (ref 36–47)
LYMPHOCYTES # BLD: 0.9 THOU/UL (ref 1.2–3.4)
LYMPHOCYTES NFR BLD AUTO: 16.7 % (ref 21–51)
MAGNESIUM SERPL-MCNC: 1.8 MG/DL (ref 1.6–2.6)
MONOCYTES # BLD AUTO: 0.6 THOU/UL (ref 0.11–0.59)
MONOCYTES NFR BLD AUTO: 12 % (ref 0–10)
NEUTROPHILS # BLD AUTO: 3.5 THOU/UL (ref 1.4–6.5)
RBC # BLD AUTO: 3.36 MILL/UL (ref 4.2–5.4)
WBC # BLD AUTO: 5.4 THOU/UL (ref 4.8–10.8)

## 2017-10-28 RX ADMIN — Medication SCH: at 10:09

## 2017-10-28 RX ADMIN — ALUMINUM ZIRCONIUM TRICHLOROHYDREX GLY SCH: 0.2 STICK TOPICAL at 13:39

## 2017-10-28 RX ADMIN — ALUMINUM ZIRCONIUM TRICHLOROHYDREX GLY SCH: 0.2 STICK TOPICAL at 13:38

## 2017-10-28 RX ADMIN — Medication PRN ML: at 05:44

## 2017-10-28 RX ADMIN — TROSPIUM CHLORIDE SCH: 20 TABLET, FILM COATED ORAL at 10:09

## 2017-10-28 NOTE — DIS
DATE OF ADMISSION:  10/25/2017

 

DATE OF DISCHARGE:  10/28/2017

 

BRIEF ADMISSION HISTORY AND PHYSICAL EXAM FINDINGS:  This is an 83-year-old female status post fall 
at her skilled nursing facility.

 

FINAL DIAGNOSES:

1.  Status post ground level fall.

2.  Questionable C7 vertebral body fracture.

3.  Acute traumatic displaced closed subcapital left femoral neck fracture and subarachnoid hemorrha
ge.

4.  History of Parkinson's disease.

5.  History of Alzheimer's dementia.

6.  History of hypertension.

 

PRINCIPAL PROCEDURES:  The patient underwent on 10/25/2017 left hip hemiarthroplasty.

 

HOSPITAL COURSE:  The patient was initially admitted to the surgical floor.  She was consulted for O
rthopedics as well as Neurosurgery.  The patient was advised not to use any anticoagulation or antip
latelet drugs such as aspirin or NSAIDs for at least 2-4 weeks per Dr. Toussaint.  The patient is at
 her baseline.  She is continued to work with Physical Therapy.  The patient was cleared for dischar
ge this morning after her sodium continued to rise at 130 this morning.  Dr. Morrison cleared her and t
ransferred to [place].  The patient was then sent to [place] with no difficulties.  The patient 
was given followup appointments with Dr. Toussaint in one month with a repeat CT scan and follow up 
with Dr. Degroot in 3-4 weeks.  Follow up with her PCP in 7 days.  Continue taking salt tablets.  NEIL bennett also recommended chemistry panel to be obtained 1 week after a period of discharge.

 

DISCHARGE CONDITION:  Fair.

 

DISCHARGE DISPOSITION:  [facility].

 

This is merely a trauma discharge summary, please refer to the chart for further information.

## 2017-10-30 ENCOUNTER — HOSPITAL ENCOUNTER (EMERGENCY)
Dept: HOSPITAL 92 - ERS | Age: 82
Discharge: HOME | End: 2017-10-30
Payer: MEDICARE

## 2017-10-30 DIAGNOSIS — F32.9: ICD-10-CM

## 2017-10-30 DIAGNOSIS — F03.90: ICD-10-CM

## 2017-10-30 DIAGNOSIS — Z79.82: ICD-10-CM

## 2017-10-30 DIAGNOSIS — I10: Primary | ICD-10-CM

## 2017-10-30 DIAGNOSIS — G20: ICD-10-CM

## 2017-10-30 DIAGNOSIS — Z86.73: ICD-10-CM

## 2017-10-30 DIAGNOSIS — Z79.899: ICD-10-CM

## 2017-10-30 LAB
ALP SERPL-CCNC: 58 U/L (ref 40–150)
ALT SERPL W P-5'-P-CCNC: 7 U/L (ref 8–55)
ANION GAP SERPL CALC-SCNC: 12 MMOL/L (ref 10–20)
APTT PPP: 30.4 SEC (ref 22.9–36.1)
AST SERPL-CCNC: 28 U/L (ref 5–34)
BILIRUB SERPL-MCNC: 0.9 MG/DL (ref 0.2–1.2)
BUN SERPL-MCNC: 19 MG/DL (ref 9.8–20.1)
CALCIUM SERPL-MCNC: 9.1 MG/DL (ref 7.8–10.44)
CHLORIDE SERPL-SCNC: 97 MMOL/L (ref 98–107)
CK SERPL-CCNC: 167 U/L (ref 29–168)
CO2 SERPL-SCNC: 26 MMOL/L (ref 23–31)
CREAT CL PREDICTED SERPL C-G-VRATE: 0 ML/MIN (ref 70–130)
GLOBULIN SER CALC-MCNC: 2.8 G/DL (ref 2.4–3.5)
HCT VFR BLD CALC: 33 % (ref 36–47)
HYALINE CASTS #/AREA URNS LPF: (no result) LPF
LACTATE SERPL-SCNC: 0.9 MMOL/L (ref 0.5–2.2)
PROTHROMBIN TIME: 13.8 SEC (ref 12–14.7)
RBC # BLD AUTO: 3.64 MILL/UL (ref 4.2–5.4)
RBC UR QL AUTO: (no result) HPF (ref 0–3)
TROPONIN I SERPL DL<=0.01 NG/ML-MCNC: 0.02 NG/ML (ref ?–0.03)
WBC # BLD AUTO: 5.9 THOU/UL (ref 4.8–10.8)
WBC UR QL AUTO: (no result) HPF (ref 0–3)

## 2017-10-30 PROCEDURE — 70450 CT HEAD/BRAIN W/O DYE: CPT

## 2017-10-30 PROCEDURE — 85610 PROTHROMBIN TIME: CPT

## 2017-10-30 PROCEDURE — 96360 HYDRATION IV INFUSION INIT: CPT

## 2017-10-30 PROCEDURE — 94760 N-INVAS EAR/PLS OXIMETRY 1: CPT

## 2017-10-30 PROCEDURE — 84484 ASSAY OF TROPONIN QUANT: CPT

## 2017-10-30 PROCEDURE — 85025 COMPLETE CBC W/AUTO DIFF WBC: CPT

## 2017-10-30 PROCEDURE — 71010: CPT

## 2017-10-30 PROCEDURE — 36416 COLLJ CAPILLARY BLOOD SPEC: CPT

## 2017-10-30 PROCEDURE — 82553 CREATINE MB FRACTION: CPT

## 2017-10-30 PROCEDURE — 83605 ASSAY OF LACTIC ACID: CPT

## 2017-10-30 PROCEDURE — 93005 ELECTROCARDIOGRAM TRACING: CPT

## 2017-10-30 PROCEDURE — 85730 THROMBOPLASTIN TIME PARTIAL: CPT

## 2017-10-30 PROCEDURE — 81003 URINALYSIS AUTO W/O SCOPE: CPT

## 2017-10-30 PROCEDURE — 81015 MICROSCOPIC EXAM OF URINE: CPT

## 2017-10-30 PROCEDURE — 84146 ASSAY OF PROLACTIN: CPT

## 2017-10-30 PROCEDURE — 80053 COMPREHEN METABOLIC PANEL: CPT

## 2017-10-30 NOTE — RAD
SEMIUPRIGHT PORTABLE CHEST ONE VIEW:

 

HISTORY:

An 83-year-old female with history of fall and subarachnoid hemorrhage last week, now in a catatonic
 state, nonresponsive verbally.

 

COMPARISON: 

10/25/2017

 

FINDINGS:

Minimal cardiomegaly.  Atherosclerosis of the aorta with some ectasia.  Minimal scattered stable chr
onic changes within the lungs.  No confluent pneumonia, overt edema, or pleural effusion.

 

IMPRESSION:   

Stable cardiomegaly.  Atherosclerosis of the aorta with ectasia.  Mild stable chronic changes.  No s
ignificant acute intrathoracic disease.

 

POS: [facility]

## 2017-10-30 NOTE — CT
NONCONTRAST CT HEAD:

 

10/30/2017

 

HISTORY:

The patient presents unresponsive and without movement of extremities.

 

COMPARISON:

10/25/2017 and 10/25/2017

 

FINDINGS:

There is a trace amount of increased density within a sulcus in the left frontal region, with findin
gs most compatible with a resolving subarachnoid hemorrhage.  No new areas of intraparenchymal or ex
traaxial hemorrhage are identified.  There is no mass effect or midline shift.  There has been no ot
her interval change compared to the prior exam.

 

IMPRESSION:

1.  No acute intracranial abnormalities demonstrated.

 

2.  Resolving subarachnoid hemorrhage, left cerebral hemisphere.

 

The above findings were discussed with Dr. Dutton in the emergency department on 10/30/2017 at 1156 
hours.

 

CODE CR

 

POS: [facility]

## 2017-11-06 ENCOUNTER — HOSPITAL ENCOUNTER (INPATIENT)
Dept: HOSPITAL 92 - ERS | Age: 82
LOS: 4 days | Discharge: NURSING, REHAB OR LONG-TERM CARE | DRG: 467 | End: 2017-11-10
Attending: SPECIALIST | Admitting: SPECIALIST
Payer: MEDICARE

## 2017-11-06 VITALS — BODY MASS INDEX: 15.5 KG/M2

## 2017-11-06 DIAGNOSIS — S72.002D: ICD-10-CM

## 2017-11-06 DIAGNOSIS — Z88.8: ICD-10-CM

## 2017-11-06 DIAGNOSIS — M97.02XA: Primary | ICD-10-CM

## 2017-11-06 DIAGNOSIS — F32.9: ICD-10-CM

## 2017-11-06 DIAGNOSIS — Z91.81: ICD-10-CM

## 2017-11-06 DIAGNOSIS — R63.6: ICD-10-CM

## 2017-11-06 DIAGNOSIS — Z86.73: ICD-10-CM

## 2017-11-06 DIAGNOSIS — Z88.2: ICD-10-CM

## 2017-11-06 DIAGNOSIS — K59.00: ICD-10-CM

## 2017-11-06 DIAGNOSIS — G20: ICD-10-CM

## 2017-11-06 DIAGNOSIS — F02.81: ICD-10-CM

## 2017-11-06 DIAGNOSIS — I10: ICD-10-CM

## 2017-11-06 DIAGNOSIS — Z88.0: ICD-10-CM

## 2017-11-06 DIAGNOSIS — Z66: ICD-10-CM

## 2017-11-06 DIAGNOSIS — E83.42: ICD-10-CM

## 2017-11-06 DIAGNOSIS — E83.39: ICD-10-CM

## 2017-11-06 DIAGNOSIS — Z96.642: ICD-10-CM

## 2017-11-06 DIAGNOSIS — N39.41: ICD-10-CM

## 2017-11-06 LAB
ALP SERPL-CCNC: 70 U/L (ref 40–150)
ALT SERPL W P-5'-P-CCNC: (no result) U/L (ref 8–55)
ANION GAP SERPL CALC-SCNC: 15 MMOL/L (ref 10–20)
APTT PPP: 31 SEC (ref 22.9–36.1)
AST SERPL-CCNC: 12 U/L (ref 5–34)
BASOPHILS # BLD AUTO: 0 THOU/UL (ref 0–0.2)
BASOPHILS NFR BLD AUTO: 0.1 % (ref 0–1)
BILIRUB SERPL-MCNC: 0.7 MG/DL (ref 0.2–1.2)
BUN SERPL-MCNC: 13 MG/DL (ref 9.8–20.1)
CALCIUM SERPL-MCNC: 9.4 MG/DL (ref 7.8–10.44)
CHLORIDE SERPL-SCNC: 95 MMOL/L (ref 98–107)
CO2 SERPL-SCNC: 25 MMOL/L (ref 23–31)
CREAT CL PREDICTED SERPL C-G-VRATE: 0 ML/MIN (ref 70–130)
EOSINOPHIL # BLD AUTO: 0 THOU/UL (ref 0–0.7)
EOSINOPHIL NFR BLD AUTO: 0.3 % (ref 0–10)
GLOBULIN SER CALC-MCNC: 2.7 G/DL (ref 2.4–3.5)
HCT VFR BLD CALC: 32.8 % (ref 36–47)
LYMPHOCYTES # BLD: 0.7 THOU/UL (ref 1.2–3.4)
LYMPHOCYTES NFR BLD AUTO: 5.5 % (ref 21–51)
MONOCYTES # BLD AUTO: 0.8 THOU/UL (ref 0.11–0.59)
MONOCYTES NFR BLD AUTO: 6.1 % (ref 0–10)
NEUTROPHILS # BLD AUTO: 11.7 THOU/UL (ref 1.4–6.5)
PROTHROMBIN TIME: 14.5 SEC (ref 12–14.7)
RBC # BLD AUTO: 3.62 MILL/UL (ref 4.2–5.4)
WBC # BLD AUTO: 13.3 THOU/UL (ref 4.8–10.8)

## 2017-11-06 PROCEDURE — 84100 ASSAY OF PHOSPHORUS: CPT

## 2017-11-06 PROCEDURE — 87070 CULTURE OTHR SPECIMN AEROBIC: CPT

## 2017-11-06 PROCEDURE — 83735 ASSAY OF MAGNESIUM: CPT

## 2017-11-06 PROCEDURE — 96375 TX/PRO/DX INJ NEW DRUG ADDON: CPT

## 2017-11-06 PROCEDURE — 80053 COMPREHEN METABOLIC PANEL: CPT

## 2017-11-06 PROCEDURE — 85730 THROMBOPLASTIN TIME PARTIAL: CPT

## 2017-11-06 PROCEDURE — C1776 JOINT DEVICE (IMPLANTABLE): HCPCS

## 2017-11-06 PROCEDURE — 85025 COMPLETE CBC W/AUTO DIFF WBC: CPT

## 2017-11-06 PROCEDURE — 96374 THER/PROPH/DIAG INJ IV PUSH: CPT

## 2017-11-06 PROCEDURE — 86850 RBC ANTIBODY SCREEN: CPT

## 2017-11-06 PROCEDURE — 86901 BLOOD TYPING SEROLOGIC RH(D): CPT

## 2017-11-06 PROCEDURE — 71010: CPT

## 2017-11-06 PROCEDURE — 72170 X-RAY EXAM OF PELVIS: CPT

## 2017-11-06 PROCEDURE — S0028 INJECTION, FAMOTIDINE, 20 MG: HCPCS

## 2017-11-06 PROCEDURE — 96361 HYDRATE IV INFUSION ADD-ON: CPT

## 2017-11-06 PROCEDURE — 80048 BASIC METABOLIC PNL TOTAL CA: CPT

## 2017-11-06 PROCEDURE — 85610 PROTHROMBIN TIME: CPT

## 2017-11-06 PROCEDURE — 86900 BLOOD TYPING SEROLOGIC ABO: CPT

## 2017-11-06 PROCEDURE — 70450 CT HEAD/BRAIN W/O DYE: CPT

## 2017-11-06 PROCEDURE — 36415 COLL VENOUS BLD VENIPUNCTURE: CPT

## 2017-11-06 PROCEDURE — 51702 INSERT TEMP BLADDER CATH: CPT

## 2017-11-06 PROCEDURE — 87205 SMEAR GRAM STAIN: CPT

## 2017-11-06 RX ADMIN — FAMOTIDINE SCH: 10 INJECTION, SOLUTION INTRAVENOUS at 22:22

## 2017-11-06 NOTE — RAD
TWO VIEW LEFT FEMUR SERIES:

 

Comparison: 11-4-17

 

Indication: Pain, prior hip replacement. 

 

FINDINGS: 

There is a newly developed fracture centered about the native intertrochanteric region with avulsion 
of the lesser trochanter. The prosthesis remains seated within the acetabulum. 

 

IMPRESSION: 

Intertrochanteric fracture with avulsion of the lesser trochanter. 

 

POS: [facility]

## 2017-11-06 NOTE — RAD
FRONTAL VIEW CHEST:

 

Comparison: 10-30-17

 

Indication: Pre-operative evaluation. 

 

FINDINGS: 

There is stable appearance of the chest with a prominent sized cardiac silhouette and mild interstiti
al prominence of each lung. No lobar consolidation or effusion. 

 

IMPRESSION: 

Stable chest. 

 

POS: [facility]

## 2017-11-06 NOTE — HP
DATE OF ADMISSION:  11/06/2017

 

REQUESTING PHYSICIAN:  Carroll Roa M.D.

 

ATTENDING SURGEON:  Carroll Helms M.D.

 

CONSULTATIONS:  Orthopedics, Dr. Degroot.

 

HISTORY OF PRESENT ILLNESS:  The patient is an 83-year-old  woman who is known to this serv
ice from a previous fall 2 weeks ago.  The patient had been discharged on 10/28/2017, status post fa
ll resulting in femoral neck fracture which she underwent partial hip replacement.  The patient toda
y was having significant left-sided hip pain and there was no report of a fall, but was brought to [facility] emergency department from [facility] where she resided.  She underwent evaluation a
nd examination and the plain radiograph showed a periprosthetic proximal femur fracture, at which ti
me we were asked to evaluate the patient for admission and obtain orthopedic consultation.

 

ALLERGIES:  HYDRALAZINE, PENICILLIN, SULFA MEDICATIONS.

 

CURRENT MEDICATIONS:  Seroquel, aspirin, Detrol, Sinemet, lisinopril, Celexa, Neupro, and extra stre
ngth Tylenol.

 

PAST MEDICAL HISTORY:  Parkinson's disease, dementia, hypertension, history of TIA, and depression.

 

PAST SURGICAL HISTORY:  Left hip hemiarthroplasty.

 

FAMILY MEDICAL HISTORY:  Noncontributory.

 

REVIEW OF SYSTEMS:  Ten-point review of systems was negative, unless otherwise stated.

 

PHYSICAL EXAMINATION:

VITAL SIGNS:  Blood pressure 160/74, heart rate 93, respirations 20, temperature is 98.0, and oxygen
 saturation is 98% on room air.

HEENT:  Head is normocephalic and atraumatic.  Eyes:  Extraocular motion intact.  PERRLA bilaterally
.  Ears are atraumatic without discharge.  Nose is atraumatic without discharge.  Oropharynx is kris
r.

NECK:  Nontender.  Trachea is midline.  There is no JVD.

CHEST:  Clear to auscultation with good inspiratory and expiratory effort.

ABDOMEN:  Soft, flat, and nontender.

HEART:  Irregularly irregular, consistent with her history of atrial fibrillation.

PELVIS:  Stable.  The patient has tenderness to palpation to left hip consistent with her fracture.

NEUROLOGIC:  She was neurovascularly intact x4 by report.

BACK:  Nontender and atraumatic.

 

LABORATORY FINDINGS:  White blood cell count 13.3, hemoglobin 10.5, hematocrit 32.8, and platelets 3
49.  Sodium 131, potassium 4.1, chloride 95, CO2 of 25, BUN 13, creatinine 0.66, and glucose 128.  L
FTs are unremarkable.  INR 1.1, PTT 31, and PT 14.

 

RADIOGRAPHIC FINDINGS:  Two views of the left femur show intertrochanteric fracture with avulsion of
 the lesser trochanter.  AP pelvis shows the same fracture pattern.  AP chest shows no acute changes
.

 

ASSESSMENT AND PLAN:

1.  Left periprosthetic proximal femur fracture.

2.  Acute pain.

3.  Hypertension.

4.  History of transient ischemic attack.

5.  History of dementia.

 

PLAN:  Plan will be to admit the patient to the surgical floor for pain management, pulmonary toilet
, gastritis and mechanical DVT prophylaxis.  Per discussion with Dr. Degroot, he plans on taking th
e patient to the operating room tomorrow.  The patient will be made n.p.o. after midnight.  The eval
uation examination, radiographic and laboratory findings were all discussed with Dr. Helms at time
 of dictation and he will see the patient later this evening.  The plan was discussed with the famil
y and all her questions were answered at this time.

## 2017-11-06 NOTE — RAD
Echo stress lab called multiple times.  No answer.  GIOVANNI had initially been recommended over the weekend by the general neurology service after discussions between neurology, primary (medicine), and vascular neurology, but is not currently recommended by the vascular neurology service for workup of his multiple lesions suspicious for possible infarcts.  Will defer to the vascular neurology service on this matter.    Continue plan for ophthal exam to look for vasculitis as underlying etiology, and consider conventional angiogram (in my general neurology note from yesterday).    Full note to follow.    Sharon Luong MD  General Neurology Spectra Link: [id number]  [facility]  PGY-2   FRONTAL VIEW PELVIS:

 

Indication: Pain. 

 

FINDINGS: 

There is a fracture of the proximal left femur with avulsion of the lesser trochanter about the patie
nt's indwelling left hip prosthesis. The prostatic femoral head remains seated within the acetabulum.
 Scattered degenerative changes of osseous structures. 

 

IMPRESSION: 

Proximal left femoral fracture with avulsion of the lesser trochanter. 

 

POS: [facility]

## 2017-11-06 NOTE — CON
DATE OF CONSULTATION:  11/06/2017

 

REQUESTING PHYSICIAN:  Dr. Carroll Helms.

 

PRINCIPAL DIAGNOSIS:  Left periprosthetic proximal femur fracture.

 

BRIEF HISTORY OF PRESENT ILLNESS:  The patient is an 83-year-old lady who was initially treated for 
a left femoral neck fracture on 10/26/2017 following a ground level fall.  This fracture was treated
 with hemiarthroplasty on 10/26/2017.  The patient had a relatively unremarkable hospital stay.  Of 
note, she also had a small subarachnoid hematoma as a result of her fall.  She was subsequently tomlinson
sferred to a skilled nursing facility.  This past Saturday, she returned to the hospital for reevalu
ation because of increasing hip pain.  At that time, x-rays were obtained and read as normal.  She n
ow returns with increasing left groin pain.  While in the emergency department, x-rays were obtained
, that showed a periprosthetic fracture with settling of the femoral stem.  When I look back upon x-
rays from this past Saturday, I do believe that there is a medial fracture just distal to the calcar
 that may have propagated, leading to the failure of this implant.  She is now being readmitted for 
anticipated revision of her hemiarthroplasty.

 

PAST MEDICAL HISTORY:  Remarkable for severe Parkinson's disease, history of hypertension, history o
f TIA, history of subarachnoid hematoma, and history of depression.

 

PAST SURGICAL HISTORY:  Remarkable for most recently a hemiarthroplasty of the left hip, now with fr
acture.

 

MEDICATIONS:  Include Seroquel, Centrum Silver, Detrol, Sinemet, lisinopril, Celexa, Neupro patch, a
nd Tylenol extra strength.

 

ALLERGIES:  HYDRALAZINE, PENICILLIN, SULFA.

 

FAMILY HISTORY:  Noncontributory.

 

SOCIAL HISTORY:  The patient is a nondrinker, does not use tobacco or illicit drugs.

 

REVIEW OF SYSTEMS:  The patient does not report recent fevers, chills or sweats.  No recent history 
of chest pain or shortness of breath.  No history of numbness or tingling in the lower extremity.

 

PHYSICAL EXAMINATION:

HEENT:  Atraumatic, normocephalic.

HEART:  Remarkable for irregularly irregular rhythm with no obvious murmur.

LUNGS:  Clear to auscultation bilaterally with shallow breath sounds.

ABDOMEN:  Flat, nontender.

PELVIS:  Stable.

EXTREMITIES:  Remarkable for bilateral upper extremities with no obvious deformity.  The left lower 
extremity is remarkable for shortening and slight external rotation at the hip.  Palpation of the hi
p shows an obvious prominence in the groin, this being either bony or perhaps some prosthetic materi
al.  Her knee appears atraumatic.  I do not appreciate any obvious trauma to lower leg or foot.

 

X-RAYS:  AP lateral of her left femur and AP pelvis remarkable for a periprosthetic fracture with th
e femoral stem now, pushed down the femoral canal and a fracture through the lesser trochanter.

 

LABORATORY DATA:  White count of 13.3, hematocrit of 32.8 and 349,000 platelets.  Chemistry panel sh
ows sodium of 131, chloride of 94, glucose 128.

 

ASSESSMENT:  A frail 83-year-old lady, now with periprosthetic proximal femur fracture.

 

PLAN:  I would like to take patient back to the operating room for removal of the current hardware, 
cabling of the proximal femur fracture and placement of a longer stem with distal purchase.  Today, 
I have had a long discussion with the patient's son-in-law and daughter regarding risks and benefits
 of this.  I believe family questions have been answered.  The patient will be readmitted to the [facility] with plans to take her to the operating room tomorrow afternoon.

## 2017-11-07 LAB
ANION GAP SERPL CALC-SCNC: 14 MMOL/L (ref 10–20)
BASOPHILS # BLD AUTO: 0 THOU/UL (ref 0–0.2)
BASOPHILS NFR BLD AUTO: 0.4 % (ref 0–1)
BUN SERPL-MCNC: 14 MG/DL (ref 9.8–20.1)
CALCIUM SERPL-MCNC: 9.1 MG/DL (ref 7.8–10.44)
CHLORIDE SERPL-SCNC: 95 MMOL/L (ref 98–107)
CO2 SERPL-SCNC: 25 MMOL/L (ref 23–31)
CREAT CL PREDICTED SERPL C-G-VRATE: 48 ML/MIN (ref 70–130)
EOSINOPHIL # BLD AUTO: 0.2 THOU/UL (ref 0–0.7)
EOSINOPHIL NFR BLD AUTO: 1.7 % (ref 0–10)
HCT VFR BLD CALC: 33.4 % (ref 36–47)
LYMPHOCYTES # BLD: 1.1 THOU/UL (ref 1.2–3.4)
LYMPHOCYTES NFR BLD AUTO: 13 % (ref 21–51)
MONOCYTES # BLD AUTO: 0.7 THOU/UL (ref 0.11–0.59)
MONOCYTES NFR BLD AUTO: 8.3 % (ref 0–10)
NEUTROPHILS # BLD AUTO: 6.8 THOU/UL (ref 1.4–6.5)
RBC # BLD AUTO: 3.64 MILL/UL (ref 4.2–5.4)
WBC # BLD AUTO: 8.8 THOU/UL (ref 4.8–10.8)

## 2017-11-07 PROCEDURE — 0SPS0JZ REMOVAL OF SYNTHETIC SUBSTITUTE FROM LEFT HIP JOINT, FEMORAL SURFACE, OPEN APPROACH: ICD-10-PCS | Performed by: ORTHOPAEDIC SURGERY

## 2017-11-07 PROCEDURE — 0SRS0JZ REPLACEMENT OF LEFT HIP JOINT, FEMORAL SURFACE WITH SYNTHETIC SUBSTITUTE, OPEN APPROACH: ICD-10-PCS | Performed by: ORTHOPAEDIC SURGERY

## 2017-11-07 RX ADMIN — Medication PRN MG: at 08:10

## 2017-11-07 RX ADMIN — CLINDAMYCIN PHOSPHATE SCH MLS: 600 INJECTION, SOLUTION INTRAVENOUS at 21:21

## 2017-11-07 RX ADMIN — ALUMINUM ZIRCONIUM TRICHLOROHYDREX GLY SCH: 0.2 STICK TOPICAL at 20:11

## 2017-11-07 RX ADMIN — FAMOTIDINE SCH MG: 10 INJECTION, SOLUTION INTRAVENOUS at 08:05

## 2017-11-07 RX ADMIN — FAMOTIDINE SCH MG: 10 INJECTION, SOLUTION INTRAVENOUS at 20:11

## 2017-11-07 RX ADMIN — Medication PRN MG: at 04:06

## 2017-11-07 RX ADMIN — TROSPIUM CHLORIDE SCH: 20 TABLET, FILM COATED ORAL at 08:17

## 2017-11-07 RX ADMIN — TROSPIUM CHLORIDE SCH MG: 20 TABLET, FILM COATED ORAL at 22:46

## 2017-11-07 NOTE — RAD
LEFT HIP TWO VIEWS:

11/7/17

 

HISTORY: 

83-year-old female postop total hip replacement. 

 

COMPARISON:  

10/25/17.

 

FINDINGS:  

There has been recent total hip replacement changes when compared to the prior study. No evidence for
 dislocation or evidence for acute periprosthetic fracture. 

 

IMPRESSION:  

Recent post hip replacement changes. There are marked three wire cables stabilizing the femoral porti
on of the prosthesis in addition to the intramedullary prosthetic portion. 

 

POS: [facility]

## 2017-11-07 NOTE — PRG
DATE OF SERVICE:  11/07/2017

 

ATTENDING PHYSICIAN:  Jhonatan Morrison DO

 

SUBJECTIVE:  No acute events overnight.  The patient was found to have periprosthetic fracture.  At 
this time, no evidence of fall.  Otherwise, the patient has had bouts of confusion and restlessness,
 otherwise, no issues.

 

OBJECTIVE:

CURRENT VITAL SIGNS:  Temperature 99.2, heart rate 97, respiratory rate 16, sats 94% on room air, an
d blood pressure 100/84.

GENERAL:  She appeared in no acute distress, lying in bed, feels a little confused.

CARDIOVASCULAR:  S1, S2, regular rate and rhythm.

PULMONARY:  Clear bilaterally to auscultation.

ABDOMEN:  Soft, nontender, nondistended.  Tenderness towards the periprosthetic fracture.

 

LABORATORY VALUES:  Today, WBC of 8.8, hemoglobin 10.5, hematocrit 33.4, and platelet count 333.  Ch
emistry:  Sodium 130, potassium 3.8, chloride 95, bicarbonate 25, BUN 14, creatinine 0.63, glucose 1
02.

 

ASSESSMENT AND PLAN:  An 83-year-old female status post unknown type of injury with preprosthetic fr
acture.  She will undergo orthopedic fixation today.  Will continue pain management and medical qi
gement postoperatively as well.  Dr. Morrison wishes to change Ativan to Valium when at this time no ot
her changes will be made.  The patient was cleared for surgery by Dr. Jhonatan Morrison who agrees with 
above plan.  He has seen her at bedside this morning.

## 2017-11-08 LAB
ANION GAP SERPL CALC-SCNC: 13 MMOL/L (ref 10–20)
BASOPHILS # BLD AUTO: 0.1 THOU/UL (ref 0–0.2)
BASOPHILS NFR BLD AUTO: 0.5 % (ref 0–1)
BUN SERPL-MCNC: 9 MG/DL (ref 9.8–20.1)
CALCIUM SERPL-MCNC: 8.6 MG/DL (ref 7.8–10.44)
CHLORIDE SERPL-SCNC: 102 MMOL/L (ref 98–107)
CO2 SERPL-SCNC: 20 MMOL/L (ref 23–31)
CREAT CL PREDICTED SERPL C-G-VRATE: 56 ML/MIN (ref 70–130)
EOSINOPHIL # BLD AUTO: 0.1 THOU/UL (ref 0–0.7)
EOSINOPHIL NFR BLD AUTO: 1 % (ref 0–10)
HCT VFR BLD CALC: 28.3 % (ref 36–47)
LYMPHOCYTES # BLD: 0.8 THOU/UL (ref 1.2–3.4)
LYMPHOCYTES NFR BLD AUTO: 7 % (ref 21–51)
MAGNESIUM SERPL-MCNC: 1.6 MG/DL (ref 1.6–2.6)
MONOCYTES # BLD AUTO: 0.7 THOU/UL (ref 0.11–0.59)
MONOCYTES NFR BLD AUTO: 6.5 % (ref 0–10)
NEUTROPHILS # BLD AUTO: 9 THOU/UL (ref 1.4–6.5)
RBC # BLD AUTO: 3.06 MILL/UL (ref 4.2–5.4)
WBC # BLD AUTO: 10.6 THOU/UL (ref 4.8–10.8)

## 2017-11-08 RX ADMIN — FAMOTIDINE SCH MG: 10 INJECTION, SOLUTION INTRAVENOUS at 22:17

## 2017-11-08 RX ADMIN — Medication PRN MG: at 05:35

## 2017-11-08 RX ADMIN — CLINDAMYCIN PHOSPHATE SCH MLS: 600 INJECTION, SOLUTION INTRAVENOUS at 14:24

## 2017-11-08 RX ADMIN — TROSPIUM CHLORIDE SCH MG: 20 TABLET, FILM COATED ORAL at 10:01

## 2017-11-08 RX ADMIN — CLINDAMYCIN PHOSPHATE SCH MLS: 600 INJECTION, SOLUTION INTRAVENOUS at 05:29

## 2017-11-08 RX ADMIN — Medication PRN MG: at 10:40

## 2017-11-08 RX ADMIN — TROSPIUM CHLORIDE SCH MG: 20 TABLET, FILM COATED ORAL at 22:59

## 2017-11-08 RX ADMIN — ALUMINUM ZIRCONIUM TRICHLOROHYDREX GLY SCH: 0.2 STICK TOPICAL at 22:17

## 2017-11-08 RX ADMIN — FAMOTIDINE SCH MG: 10 INJECTION, SOLUTION INTRAVENOUS at 10:02

## 2017-11-08 RX ADMIN — Medication PRN MG: at 16:29

## 2017-11-08 NOTE — PRG-2
DATE OF SERVICE:  11/08/12017

 

ATTENDING PHYSICIAN:  Jhonatan Morrison DO

 

SUBJECTIVE:  The patient did experience some agitation associated with spikes 
in blood pressure overnight and early this morning.

 

On exam, she was finally resting in bed.  The nurse stated that they had just 
got her calmed down.  Family reported that she has been experiencing confusion 
and restlessness. 

 

OBJECTIVE:

VITAL SIGNS:  Blood pressure 127/54, pulse 96, respiratory rate 17, and 
temperature 99.0 degrees Fahrenheit, oxygen saturation 94% on room air.

GENERAL:  The patient is lying comfortably in bed.  She was asleep on 
presentation.

CARDIOVASCULAR:  Regular rate and rhythm, no murmurs.

PULMONARY:  Equal rise and fall of the chest:  No respiratory distress.

ABDOMEN:  Soft, nontender, nondistended.

 

LABORATORY DATA:  CBC revealed white blood cell count of 10.6, hemoglobin 9.1, 
hematocrit 98.3 and a platelet count of 319.  BMP reveals sodium 131, potassium 
4.4, chloride 102, bicarbonate 20, BUN 9, creatinine of 0.54, glucose 115, 
phosphorus was 2.0 this morning, magnesium 1.6.  Brain CT without contrast was 
performed, which showed no acute intracranial process.  No evidence of 
subarachnoid hemorrhage.

 

ASSESSMENT:

1.  Status post orthopedic fixation of periprosthetic fracture.

2.  Parkinson's disease.

3.  Parkinsonian dementia.

4.  Hypertension.

5.  History of transient ischemic attack.

6.  Depression.

7.  Hypophosphatemia.

8.  Hypomagnesemia.

 

PLAN:  Continue with pain management and medical management. Patient switched 
back to Ativan at the request of the family as they state that it works better 
for the patient.  Fluids were discontinued this morning.  The patient has not 
been eating or drinking much; however, she is still eating and drinking on her 
own and does so at home.  Ensure was added for nutrition.  Electrolytes were 
replaced.  We will speak with Neurosurgery regarding aspirin versus Lovenox for 
DVT prophylaxis.

 

The patient was seen and evaluated by Dr. Jhonatan Morrison at bed side.

 

[facility]

## 2017-11-08 NOTE — PRG
DATE OF SERVICE:  11/08/2017

 

Scottie is back in the hospital.  We were part of her care team last time she was admitted a few wee
ks ago with traumatic subarachnoid hemorrhage and hip fracture.  She is over 2 weeks out from the in
[facility].  If her subarachnoid blood is completely resolved, she can be put on anticoagulation after h
er hip surgery.  Her latest CT scan showed good resolution of the blood and I anticipate the one ludin
t we have ordered which will complete completion of that resolution.  She does not require neurosurg
ical intervention.

## 2017-11-08 NOTE — CT
NONCONTRAST HEAD CT:

 

History: Follow up subarachnoid hemorrhage. Past medical history of subarachnoid hemorrhage.  

 

Comparison: 10-30-17

 

Technique: Noncontrast head CT is performed from skull base to skull vertex. 

 

FINDINGS: 

No parenchymal hemorrhage, no extraaxial hematoma. No midline shift. Basilar cisterns are patent. Ag
e appropriate atrophy. Cortical gray white matter differentiation is preserved. Ventricles and sulci
 are patent and symmetric. Hyperostosis frontalis interna is noted. Calvarium is intact. Adequate ae
ration of the sinuses and mastoid air cells. 

 

IMPRESSION: 

1. No acute intracranial process. 

2. No CT evidence of subarachnoid hemorrhage. 

 

POS: [facility]

## 2017-11-09 RX ADMIN — TROSPIUM CHLORIDE SCH MG: 20 TABLET, FILM COATED ORAL at 20:21

## 2017-11-09 RX ADMIN — ALUMINUM ZIRCONIUM TRICHLOROHYDREX GLY SCH: 0.2 STICK TOPICAL at 20:39

## 2017-11-09 RX ADMIN — TROSPIUM CHLORIDE SCH MG: 20 TABLET, FILM COATED ORAL at 09:49

## 2017-11-09 RX ADMIN — DOCUSATE SODIUM 50 MG AND SENNOSIDES 8.6 MG SCH TAB: 8.6; 5 TABLET, FILM COATED ORAL at 20:21

## 2017-11-09 RX ADMIN — DOCUSATE SODIUM 50 MG AND SENNOSIDES 8.6 MG SCH TAB: 8.6; 5 TABLET, FILM COATED ORAL at 09:49

## 2017-11-09 NOTE — PRG-2
DATE OF SERVICE:  11/09/2017

 

ATTENDING PHYSICIAN:  Dr. Vitaliy Escamilla.

 

SUBJECTIVE:  This is an 83-year-old woman status post orthopedic fixation of 
periprosthetic fracture secondary to ground level fall.  The patient has 
experienced agitation while being in the hospital.  She has had to be sedated 
with Ativan.  She has also been receiving morphine for pain.  This morning, she 
was finally resting comfortably in bed.  The family reported that they were 
just able to calm her down.  Family also requested that the patient get 
scheduled Tylenol and Toradol and that the pain medication she is taking 
currently be reconsidered.  She seems to be too sedated.  The patient seems to 
get hypertensive and tachycardic when agitated, and she has yet to have a bowel 
movement since admission.

 

OBJECTIVE:

VITAL SIGNS:  Blood pressure 150/89, pulse 101, respiratory rate 14, oxygen 
saturation 97% on room air, temperature 98.3 degrees Fahrenheit.

GENERAL:  The patient is resting comfortably in bed.  She does not appear to be 
in any distress.

CARDIOVASCULAR:  Regular rate and rhythm, no murmurs.

PULMONARY:  Equal rise and fall of the chest.  No respiratory distress.

ABDOMEN:  Soft, nontender, nondistended.

 

PERTINENT LABORATORY DATA:  There are no labs or imaging to report on this 
morning.

 

ASSESSMENT:

1.  Day #2, status post orthopedic fixation of periprosthetic fracture.

2.  Parkinson's disease.

3.  Parkinson's intervention.

4.  Hypertension.

5.  History of transient ischemic attack.

6.  Depression.

7.  Hypophosphatemia.

8.  Hypomagnesemia.

 

PLAN:  Pain medications adjusted.  The patient has scheduled Tylenol and 
Toradol.  Additionally, we will switch hydrocodone to tramadol and discontinue 
the morphine with a goal of decreasing the amount of sedation.  Additionally, 
we will begin Lovenox for DVT prophylaxis.  This was discussed with 
Neurosurgery.  Electrolytes were replaced yesterday.  Bowel regimen was added.  
We will continue to follow the patient throughout the duration of her hospital 
stay.  The patient was seen and evaluated by Dr. Escamilla.  The above plan was 
discussed with the patient's family at bedside.

 

CHRIS

## 2017-11-09 NOTE — PRG
DATE OF SERVICE:  11/09/2017

 

I reviewed the CT scan of Marie Rubin which is negative for subarachnoid hemorrhage.  Her blood pro
ducts have cleared up and she is cleared for commencement of her Lovenox.

## 2017-11-09 NOTE — PDOC.EVN
Event Note





- Event Note


Event Note: 


Attending Consult H&P


I personally evaluated the patient and discussed the management with Dr. Barger.


Anna bejarano reviewed the written Consult note by Dr Barger and it repeated by me.


I agree with the History, Examination, Assessment and Plan documented above 

with any addition or exceptions noted below.


We were consulted to help with medical management.  


we have identified agitation as a possible issue. Prn ativan ordered.


The trauma service has adjusted her meds already today.


We will consider further adjustments as needed.  


With her underlying dementia and pain, I expect there to be delirium and 

agitation despite our best efforts.


Her room as large windows that let in natural light to help with sleep wake 

cylce and family is present.  


Her routine meds have been restarted.  


Her urine output has diminished today.  the nurse and family note she has not 

been eating or drinking only minimally.


We are giving a 1Liter bolus and then starting maintenance.

## 2017-11-09 NOTE — CON-2
DATE OF CONSULTATION: 11/09/2017

 

CODE STATUS:  DNR.

 

PRIMARY CARE PHYSICIAN:  Hermilo Phillip MD.

 

ATTENDING PHYSICIAN:  Kleber Andrew M.D.

 

RESIDENT:  Arnav Barger M.D.

 

HISTORIAN:  Multiple family members.

 

CHIEF CONSULT: This is a consult for medical management.

 

HISTORY OF PRESENT ILLNESS:  An 83-year-old female with a fall 2 weeks ago 
resulting in a left femoral neck fracture that was repaired with a left hip 
hemiarthroplasty.  The patient was discharged on 10/28/2017 but was readmitted 
on 11/06/2017 with hip pain and subsequently found to have a fracture and 
implant failure.  Orthopedic Surgery performed revision and the patient is 
currently recovering.  Family Medicine has been consulted for medical 
management presumably for increased agitation, combativeness, and weakness in 
the last 48 hours.  The patient has been participating in PT, but family 
describes bouts of sundowning and agitation once sedation wears off.  Primary 
team has made medication changes today in order to prevent prolonged sedation.

 

PAST MEDICAL HISTORY:

1.  Severe Parkinson's disease.

2.  Parkinson's dementia.

3.  Hypertension.

4.  History of TIA.

5.  History of subarachnoid hematoma.

5.  Depression.

 

PAST SURGICAL HISTORY: Left hemiarthroplasty on 10/26/2017 with revision on 11/
06/2017.

 

ALLERGIES: 

1.  HYDRALAZINE.

2.  PENICILLIN.

3.  SULFA.

 

MEDICATIONS:

1.  Acetaminophen 1000 mg p.o. q.6 hours.

2.  DuoNeb 3 mL nebulize q.4 hours.

3.  Sinemet 25/100 mg 2 tabs t.i.d.

4.  Celexa 20 mg p.o. daily.

5.  Lovenox 30 mg subcutaneous daily.

6.  Pepcid 20 mg p.o. b.i.d.

7.  Toradol 50 mg IV q.6 hours.

8.  Lisinopril 2.5 mg p.o. daily.

9.  Ativan 0.5 mg p.o. q.6 hours p.r.n.

10.  Neupro patch 2 mg for 24 hours.

11.  MiraLax 17 grams p.o. daily.

12.  Seroquel 50 mg p.o. at bedtime.

13.  Senokot 1 tab p.o. b.i.d.

14.  Tramadol 100 mg p.o. q.6 hours p.r.n.

15.  Trospium 20 mg p.o. b.i.d.

 

FAMILY HISTORY:  Noncontributory.

 

SOCIAL HISTORY:  No history of tobacco, alcohol or drug use.  The patient is a 
current resident at [facility].

 

REVIEW OF SYSTEMS:  Obtained from family members.  General:  The patient denies 
fever, chills, or fatigue.  Eyes:  Denies eye pain.  ENT:  Denies nasal 
congestion, denies rhinorrhea.  Respiratory:  Denies cough, denies congestion.  
Cardiovascular:  Denies chest pain or palpitations.  Gastrointestinal:  Denies 
nausea, denies vomiting, denies diarrhea.  Endorses constipation.  Skin:  
Denies rashes, denies lesions.  Musculoskeletal:  Endorses left hip pain.  
Psychiatric:  Endorses anxiety and combativeness.  

 

PHYSICAL EXAMINATION:

VITAL SIGNS:  Blood pressure 150/89, pulse of 101, respirations 14, temperature 
max 98.3, pulse ox 97% on room air and weight is currently 45 kilograms.

HEENT:  The patient is asleep and unarousable. This has been her baseline since 
admission.

NECK:  Supple with no thyromegaly.

CARDIOVASCULAR:  Regular rate and rhythm with no murmurs, no gallops.

RESPIRATIONS:  Clear to auscultation bilaterally with paradoxical chest 
movement upon inspiration and expiration.

ABDOMEN:  Soft, no masses or distention palpated.

EXTREMITIES:  Show left hip dressing is clean, dry, and intact.

SKIN:  Warm and dry.

EXTREMITIES:  No cyanosis.

 

ASSESSMENT AND PLAN:  An 83-year-old female here for a left hemiarthroplasty 
revision.

1.  Orthopedic revision:  the patient is participating in physical therapy, 
this is postop day #2.  Continue care per primary team.

2.  Agitation: We will monitor new regimen of Tylenol q.6 hours scheduled with 
Toradol q.6 hours scheduled and tramadol q.6 hours p.r.n.  Morphine and Norco 
have been discontinued.  This should decrease the patient's level of sedation.  
The patient has Ativan 0.5 mg q.6 hours p.r.n. for agitation, it has not been 
used today.  Seroquel and trospium may be contributing to this delirious 
picture.  If the patient does not improve with medication changes made by the 
primary team, we will adjust doses of Seroquel and Ativan in the coming days.  
Otherwise, continue medical management per primary team.

3.  Constipation:  The patient has had minimal p.o. intake since surgery with 
no bowel movement since then.  We will continue the MiraLax and Senokot as 
scheduled. This was started yesterday, so it will need more time to take 
effect.  We are anticipating a bowel movement with the cessation of her 
narcotics.

4.  Parkinson's disease:  Continue Sinemet and Neupro patch.

5.  Hypertension:  Continue lisinopril.

6.  Depression:  Continue Celexa. 

7.  Urge incontinence:  Continue trospium chloride

 

Thank you for allowing us to participate in the care of this patient.  

 

DISPOSITION/LENGTH OF HOSPITAL STAY:  Per primary team.

 

Symptomatic meds will be provided.

 

History and physical exam as well as management were discussed with Dr. Kleber Andrew.

 

CHRIS

## 2017-11-10 VITALS — TEMPERATURE: 97 F | DIASTOLIC BLOOD PRESSURE: 72 MMHG | SYSTOLIC BLOOD PRESSURE: 163 MMHG

## 2017-11-10 LAB
ANION GAP SERPL CALC-SCNC: 6 MMOL/L (ref 10–20)
BUN SERPL-MCNC: 10 MG/DL (ref 9.8–20.1)
CALCIUM SERPL-MCNC: 7.9 MG/DL (ref 7.8–10.44)
CHLORIDE SERPL-SCNC: 107 MMOL/L (ref 98–107)
CO2 SERPL-SCNC: 25 MMOL/L (ref 23–31)
CREAT CL PREDICTED SERPL C-G-VRATE: 61 ML/MIN (ref 70–130)
MAGNESIUM SERPL-MCNC: 1.6 MG/DL (ref 1.6–2.6)

## 2017-11-10 RX ADMIN — DOCUSATE SODIUM 50 MG AND SENNOSIDES 8.6 MG SCH TAB: 8.6; 5 TABLET, FILM COATED ORAL at 08:39

## 2017-11-10 RX ADMIN — TROSPIUM CHLORIDE SCH MG: 20 TABLET, FILM COATED ORAL at 08:38

## 2017-11-10 NOTE — PQF
EDITH MCMAHAN KATIE JO

[id number]                                                             [facility] A-
[phone]

[id number]                             

                                   

CLINICAL DOCUMENTATION IMPROVEMENT CLARIFICATION FORM:  ICD-10 Updated



PLEASE DO AN ADDENDUM TO THE PROGRESS NOTE WITH ANY DOCUMENTATION UPDATES OR 
ADDITIONS AND CARRY THROUGH TO DC SUMMARY.   THANK YOU.



Date:    11-10-17                                                             
ATTN:  DR. TEE SAINI / DR. HOANG MACIEL 



Please exercise your independent, professional judgment in responding to the 
clarification form. 

Clinical indicators are provided on the bottom of this form for your review



Please check appropriate box(s):

[  ] Protein Calorie Malnutrition:    [  ] Mild      [  ] Moderate   [  ] 
Severe   

[  ] Other Malnutrition (please specify) _______________________________________
__

[ x ] Underweight without malnutrition

[  ] Cachexia 

[  ] Other diagnosis ___________

[  ] Unable to determine



In addition, please specify:

Present on Admission (POA):  [ x ] Yes             [  ] No             [  ] 
Unable to determine



CLINICAL INDICATORS - SIGNS / SYMPTOMS / LABS



H&P:  FRAIL



BMI of 15



NUTRITION CONSULT:  WEIGHT LOSS :  - 9.1% over the past 1 month

Family member reports that she likes the nutritional supplements but has not 
been eating well and thinks it is due to patient either being tired, combative 
or sedated. Patient had a few bites of oatmeal and some eggs this morning. No N/
V/D,  and last BM was 11/5. Patient does have not have any issues with chewing 
or swallowing but family member notices that she eats more of the soft foods. 
Family member also is concerned about the patient having about a 10 lb weight 
loss in one month



KCAL / G PROTEIN:  LIKELY NOT MET

UNINTENDED WEIGHT LOSS:  DEMENTIA; COMBATIVE

14% avg of last 6 meals, patient has not been eating well for some time 





RISK FACTORS

H&P:  DEMENTIA; PARKINSON'S; 



TREATMENT:

NUTRITION CONSULT

1. Continue current regular diet due to poor intake.

2. Provide Ensure Enlive TID to promote healing and weight gain.

3. Provide bowel regimen PRN.



THANK YOU,

ILA



(This form is maintained as a part of the permanent medical record)

 2015 [employer].  All Rights Reserved

Ila Boyer, RN, BS    [email or web address]    Cell Phone:  [phone]

                                                              

 

[facility] Hospital[facility]LYN

## 2017-11-10 NOTE — PDOC.FM
- Subjective


Subjective: 





Patient is asleep in bed this morning. Her sitter reports no issues overnight, 

with no bouts of agitation. Patient moves extremities and calls for family when 

stimulated. She still does not answer questions or follow commands, which is 

her baseline since admission per family. 





- Objective


MAR Reviewed: Yes


Vital Signs & Weight: 


 Vital Signs (12 hours)











  Temp Pulse Resp BP Pulse Ox


 


 11/10/17 04:23  97.5 F L  61  20  123/57 L  96


 


 11/09/17 20:15  98.5 F  102 H  20  156/82 H  96








 Weight











Admit Weight                   45 kg


 


Weight                         45 kg














I&O: 


 











 11/09/17 11/10/17 11/11/17





 06:59 06:59 06:59


 


Intake Total 920 2625 


 


Output Total 810 850 


 


Balance 110 1775 











Result Diagrams: 


 11/08/17 05:21





 11/10/17 05:26





<Arnav Barger - Last Filed: 11/10/17 08:04>





- Objective


Vital Signs & Weight: 


 Vital Signs (12 hours)











  Temp Pulse Resp BP Pulse Ox


 


 11/10/17 08:55  97 F L  88  12  163/72 H  94 L


 


 11/10/17 08:38   61   


 


 11/10/17 04:23  97.5 F L  61  20  123/57 L  96








 Weight











Admit Weight                   45 kg


 


Weight                         45 kg














I&O: 


 











 11/09/17 11/10/17 11/11/17





 06:59 06:59 06:59


 


Intake Total 920 2625 


 


Output Total 810 850 


 


Balance 110 1775 











Result Diagrams: 


 11/08/17 05:21





 11/10/17 05:26





<Lori Lloyd - Last Filed: 11/10/17 10:26>





Phys Exam





- Physical Examination


Neck: no nodes, no JVD


Respiratory: no wheezing, no rales, no rhonchi


Cardiovascular: RRR, no significant murmur


Gastrointestinal: soft, non-tender


Musculoskeletal: no edema


not following commands or answering questions; appears less sedated today


moving extremities with stimulation


Skin: no rash





<Arnav Barger - Last Filed: 11/10/17 08:04>





Dx/Plan


(1) Agitation


Status: Acute   


Plan: 


Morphine and Norco have been discontinued. Patient appears less sedated which 

will hopefully decrease incidence of agitation/sundowning. 





Continue pain regimen of Tylenol LUÍS, toradol LUÍS, and tramadol PRN





Seroquel is LUÍS and patient also has Ativan PRN








(2) Constipation


Code(s): K59.00 - CONSTIPATION, UNSPECIFIED   Status: Acute   


Plan: 


Continue Senna and Miralax. Bowel regimen has just been started so we will not 

escalate therapy today. 








(3) Parkinsons disease


Code(s): G20 - PARKINSON'S DISEASE   Status: Acute   


Plan: 


Continue Sinemet and Neupro patch. Tremor is at her baseline according to 

family members








(4) Hypertension


Code(s): I10 - ESSENTIAL (PRIMARY) HYPERTENSION   Status: Chronic   


Plan: 


Blood pressure has been within normal limits. Continue Lisinopril








(5) Depression


Code(s): F32.9 - MAJOR DEPRESSIVE DISORDER, SINGLE EPISODE, UNSPECIFIED   Status

: Chronic   


Qualifiers: 


   Depression Type: major depressive disorder 


Plan: 


Continue Celexa








(6) Hip fracture, left


Code(s): S72.002A - FRACTURE OF UNSP PART OF NECK OF LEFT FEMUR, INIT   Status: 

Acute   


Qualifiers: 


   Encounter type: subsequent encounter 


Plan: 


Primary team managing








(7) Dementia


Code(s): F03.90 - UNSPECIFIED DEMENTIA WITHOUT BEHAVIORAL DISTURBANCE   Status: 

Chronic   





(8) Urge incontinence


Code(s): N39.41 - URGE INCONTINENCE   Status: Chronic   


Plan: 


Continue Trospium








- Plan


Plan: 





Plan:


-monitor sedation level and episodes of agitation today; if needed, we can 

adjust dose of Seroquel or patients anticholinergic, Trospium


-Patient has been constipated but has had minimal to no PO intake; monitor for 

BM today





<Arnav Barger - Last Filed: 11/10/17 08:04>





Attending Addendum





- Attending Addendum


I personally evaluated the patient and discussed the management with Dr. Barger 

on 11/10/17.


I agree with the History, Examination, Assessment and Plan documented above 

with any addition or exceptions noted below.


D/w trauma team, will transition back to [facility] today.  Today she is less 

agitated and ate breakfast.  Will likely take some time for medication 

adjustments to allow return to normal baseline.  Will ensure pain is controlled 

and constipation is resolved.  Will plan on leaving Cerda in during transition 

back to [place], as she often becomes extremely agitated when her briefs are 

changed, and there is concern she may damage her hip again.  Will communicate 

with PCP at [facility] to ensure it is removed in 24-28 hours.





<Lori Lloyd - Last Filed: 11/10/17 10:26>

## 2017-11-11 NOTE — DIS
ATTENDING PHYSICIAN:  Chele Alarcon M.D.

 

DISCHARGE DIAGNOSIS:  Left periprosthetic femur fracture, status post repair.

 

HOSPITAL COURSE:  Ms. Rubin is an 83-year-old female previously known to the 
trauma service after a fall 2 weeks ago resulting in a femoral neck fracture.  
At that time, she underwent partial hip replacement.  On the day of this 
admission, she had no known injury, but had significant left hip pain.  She was 
taken to the emergency department where clinical and diagnostic evaluation 
identified a periprosthetic proximal femur fracture.  She was admitted to the 
Trauma Service with a consult to Orthopedics.  She was taken to the OR for 
operative repair of the fracture by Dr. Degroot.  See operative note for 
detailed report.  Postoperatively, she was managed on the surgical floor where 
she remained hemodynamically stable.  She had bouts of confusion and 
restlessness.  Brain CT identified no intracranial changes.  It was felt that 
she the confusion was a result of sedating medications. Adjustment of 
medications resulted in return to her baseline mentation.  Case management was 
consulted for discharge planning to skilled nursing facility.  The patient was 
accepted to a skilled nursing facility and discharged on 11/10/2017.  She will 
follow up with the Orthopedic Service.

 

Hospital Medicine Team discussed the patient with her PCP at [facility].  Plan 
for discharge and assessment was discussed with trauma attending, who agrees.

 

CHRIS

## 2017-11-13 NOTE — OP
DATE OF SURGERY:  11/07/2017

 

PREOPERATIVE DIAGNOSIS:  Left periprosthetic proximal femur fracture.

 

POSTOPERATIVE DIAGNOSIS:  Left periprosthetic proximal femur fracture.

 

SURGICAL PROCEDURE:  Revision of left hip hemiarthroplasty.

 

ANESTHESIA:  General.

 

SURGEON:  Joe Degroot M.D.

 

ASSISTANT:  Daniel Roman PA-C.

 

BLOOD LOSS:  200 mL.

 

IMPLANTS:  Synthes 1.7 mm cables x3 and then the [employer] system with a 16 x 140 mm distal stem
, a 20 x 78 mm proximal body, a 28 x 42 bipolar component and a +1.5 head.

 

SPECIMEN:  Explanted [place] stem.

 

COMPLICATIONS:  None.

 

DRAINS:  None.

 

INDICATIONS:  The patient is an 83-year-old lady with a significant history of Parkinson's and other
 medical comorbidities.  She is status post femoral neck fracture treated with a hemiarthroplasty.  
Sometime over the last week, she has had a failure of this construct with a fracture of the proximal
 femur around the implant now settling of the implant.  After discussion with family including risks
 and benefits, we decided to proceed with a revision.  Informed consent has been obtained.

 

PROCEDURE:  After the induction of general anesthesia, the patient was positioned in a right lateral
 decubitus position and then using the prior skin incision, a Kocher approach to the hip was perform
ed.  The incision was extended a bit more distally.  Once the exposure was obtained, the hemiarthrop
lasty was removed without difficulty.  At this point, the fracture was inspected.  This involved tegan
car and extended down approximately 2-3 inches.  Next, the wound was thoroughly irrigated with Pulsa
vac.  This was then followed by mobilization of the calcar fragment and then hold in place with krystal
culums.  Next, three Synthes 1.7 mm cables were wrapped around the calcar fragment getting stabiliza
tion of this fragment.  This was then followed by preparation of the femoral canal with Reclaim syst
em.  An initial proximal reamer was introduced into the canal of the femur.  This was then followed 
by distal reaming.  Once reaming was achieving cortical chatter, further reaming was stopped.  Next,
 a 16 x 140 mm distal stem was then inserted.  This was followed by application of a trial proximal 
body and hemiarthroplasty component which showed good restoration of leg length and stability.  As s
uch, the proximal trial body was removed.  The final body was reconstructed and fixed to the distal 
stem along with the 28 x 42 bipolar head.  The hip was then reduced and found to be stable.  This wa
s thoroughly irrigated with 5 liters of normal saline during the course of the procedure.  Next, the
 capsule was reapproximated with #2 Vicryl followed by #2 Vicryl for the piriformis, one Vicryl was 
used for the tensor fascia and gluteal fascia followed by 0 Vicryl for the Caryn's fascia, and then
 2-0 Vicryl and staples for the skin and Xeroform gauze and tape dressing was applied to the thigh a
nd then patient was transferred to recovery room in stable condition.  There were no complications. 
 She tolerated the procedure well.

## 2018-01-14 ENCOUNTER — HOSPITAL ENCOUNTER (INPATIENT)
Dept: HOSPITAL 92 - SURG A | Age: 83
LOS: 3 days | Discharge: NURSING, REHAB OR LONG-TERM CARE | DRG: 464 | End: 2018-01-17
Attending: ORTHOPAEDIC SURGERY | Admitting: ORTHOPAEDIC SURGERY
Payer: MEDICARE

## 2018-01-14 VITALS — BODY MASS INDEX: 16.6 KG/M2

## 2018-01-14 DIAGNOSIS — G20: ICD-10-CM

## 2018-01-14 DIAGNOSIS — Z86.73: ICD-10-CM

## 2018-01-14 DIAGNOSIS — M25.352: ICD-10-CM

## 2018-01-14 DIAGNOSIS — M97.02XA: ICD-10-CM

## 2018-01-14 DIAGNOSIS — I10: ICD-10-CM

## 2018-01-14 DIAGNOSIS — F32.9: ICD-10-CM

## 2018-01-14 DIAGNOSIS — S72.002A: Primary | ICD-10-CM
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
## 2018-01-14 DIAGNOSIS — Z91.81: ICD-10-CM

## 2018-01-14 LAB
ALBUMIN SERPL BCG-MCNC: 3.9 G/DL (ref 3.4–4.8)
ALP SERPL-CCNC: 145 U/L (ref 40–150)
ALT SERPL W P-5'-P-CCNC: (no result) U/L (ref 8–55)
ANION GAP SERPL CALC-SCNC: 11 MMOL/L (ref 10–20)
APTT PPP: 28.8 SEC (ref 22.9–36.1)
AST SERPL-CCNC: 12 U/L (ref 5–34)
BILIRUB SERPL-MCNC: 0.2 MG/DL (ref 0.2–1.2)
BUN SERPL-MCNC: 15 MG/DL (ref 9.8–20.1)
CALCIUM SERPL-MCNC: 9.6 MG/DL (ref 7.8–10.44)
CHLORIDE SERPL-SCNC: 100 MMOL/L (ref 98–107)
CO2 SERPL-SCNC: 29 MMOL/L (ref 23–31)
CREAT CL PREDICTED SERPL C-G-VRATE: 43 ML/MIN (ref 70–130)
GLOBULIN SER CALC-MCNC: 2.8 G/DL (ref 2.4–3.5)
GLUCOSE SERPL-MCNC: 103 MG/DL (ref 83–110)
HGB BLD-MCNC: 11.7 G/DL (ref 12–16)
INR PPP: 1.1
MCH RBC QN AUTO: 28.1 PG (ref 27–31)
MCV RBC AUTO: 88.1 FL (ref 81–99)
MDIFF COMPLETE?: YES
OVALOCYTES BLD QL SMEAR: (no result) (100X)
PLATELET # BLD AUTO: 274 THOU/UL (ref 130–400)
PLATELET BLD QL SMEAR: (no result)
POLYCHROMASIA BLD QL SMEAR: (no result) (100X)
POTASSIUM SERPL-SCNC: 4.7 MMOL/L (ref 3.5–5.1)
PROTHROMBIN TIME: 14.1 SEC (ref 12–14.7)
RBC # BLD AUTO: 4.16 MILL/UL (ref 4.2–5.4)
SODIUM SERPL-SCNC: 135 MMOL/L (ref 136–145)
WBC # BLD AUTO: 4.7 THOU/UL (ref 4.8–10.8)

## 2018-01-14 PROCEDURE — 85610 PROTHROMBIN TIME: CPT

## 2018-01-14 PROCEDURE — 36415 COLL VENOUS BLD VENIPUNCTURE: CPT

## 2018-01-14 PROCEDURE — 85730 THROMBOPLASTIN TIME PARTIAL: CPT

## 2018-01-14 PROCEDURE — 80053 COMPREHEN METABOLIC PANEL: CPT

## 2018-01-14 PROCEDURE — 85025 COMPLETE CBC W/AUTO DIFF WBC: CPT

## 2018-01-14 RX ADMIN — TROSPIUM CHLORIDE SCH MG: 20 TABLET, FILM COATED ORAL at 23:39

## 2018-01-14 NOTE — HP
DATE OF ADMISSION:  01/14/2018

 

PRINCIPAL DIAGNOSIS:  Left periprosthetic proximal femur fracture with dislocation.

 

HISTORY OF PRESENT ILLNESS:  Patient is an 83-year-old lady with a history of Parkinson's and multipl
e medical problems who originally was treated for a femoral neck fracture on 10/26/2017 following a g
round level fall.  She was treated with a hemiarthroplasty on the day of injury.  Unfortunately, she 
went on to develop a periprosthetic fracture that required a revision surgery including a long stem. 
 She had a relatively uneventful hospital course and [facility]
, but then was noted postoperatively approximately 1 week following surgery to have shortening of t
he leg and a followup x-ray showed refracture of the proximal femur again with dislocation.  We had a
 lengthy discussion with family regarding treatment options and at that time, opted to treat this non
surgically understanding that would require patient now to be just bed to chair transfer.  She origin
ally tolerated this; however, is having some increasing pain and as such now returns for anticipated 
revision surgery with removal of the femoral head and conversion to a type of a Girdlestone procedure
 in hopes of providing pain relief.

 

PAST MEDICAL HISTORY:  Remarkable for Parkinson's disease, hypertension, history of TIA, history of s
ubarachnoid hemorrhage as well as history of depression.

 

PAST SURGICAL HISTORY:  Remarkable for hemiarthroplasty of the left hip as well as revision hemiarthr
oplasty with open reduction and internal fixation of the proximal femur.

 

MEDICATIONS:  Includes Seroquel, Centrum Silver, Detrol, Sinemet, lisinopril, Celexa, Neupro patch, t
ramadol, and Ativan on p.r.n. basis.

 

ALLERGIES:  HYDRALAZINE, PENICILLIN, and SULFA.

 

FAMILY HISTORY:  Noncontributory.

 

SOCIAL HISTORY:  She is a nondrinker and does not use tobacco or illicit drugs.

 

REVIEW OF SYSTEMS:  No recent fevers, chills or sweats.  No complaints of chest pain.  Denies numbnes
s or tingling in the lower extremity, but is not ambulatory due to the shortening and dislocated natu
re of the left hemiarthroplasty.

 

PHYSICAL EXAMINATION:

VITAL SIGNS:  Temperature 97.9, heart rate of 88, respiratory rate of 18, and blood pressure of 193/7
2.

HEENT:  Atraumatic, normocephalic.

HEART:  Remarkable for irregularly irregular rhythm but no obvious murmur.

LUNGS:  Clear to auscultation bilaterally with shallow breath sounds.

ABDOMEN:  Flat, nontender.  The patient is cachectic.

PELVIS:  Stable.

EXTREMITIES:  Remarkable for bilateral upper extremities that are atraumatic.  Right lower extremity 
is also atraumatic.  Left lower extremity remarkable for obvious shortening and internal rotation at 
the hip.  Palpation shows a prominence posteriorly consistent with a posterior dislocation of the hip
.  Knee, calf, ankle and foot appear atraumatic.

 

X-RAYS:  Prior films done at [facility] shows a fracture dislocation at the proximal revision hemiarth
roplasty.  New films are scheduled for today.

 

LABORATORY DATA:  CBC, complete metabolic panel, PT, PTT are scheduled for today as well.

 

ASSESSMENT:  Frail 83-year-old lady with multiple medical problems and now failed hip hemiarthroplast
y x2 with complex periprosthetic fracture and instability of the joint.

 

PLAN:  Today, I have had an extensive conversation with family and we have actually had this conversa
tion on multiple times in the past few weeks regarding treatment options.  At this time, we would lik
e to take the patient back to the operating room, remove the femoral head and essentially convert the
 patient to a Girdlestone type limb in hopes of providing pain relief.  I do not believe at this time
 that this hip is easily reconstructable and certainly with her medical comorbidities, an extensive p
rocedure is probably not in her best interest medically.  I believe the family is in agreement and at
 this time does wish to proceed with minimal surgery necessary to provide comfort and improved qualit
y of life with the understanding that this will not result in a limb that will be able to bear weight
.  The patient will be n.p.o. after midnight with plans to proceed with surgery tomorrow morning.

## 2018-01-14 NOTE — RAD
LEFT FEMUR TWO VIEWS:

 

History: Dislocation. 

 

Comparison: 

 

FINDINGS: 

There is dislocation of the left hip. Extensive heterotopic ossification. Old subtrochanteric fractur
e. 

 

IMPRESSION: 

Posterolateral dislocation of the left hip with foreshortening approximately 9 cm. 

 

POS: [facility]

## 2018-01-14 NOTE — RAD
LEFT HIP TWO VIEWS:

 

History: Dislocation. 

 

Comparison: 7-7-17

 

FINDINGS: 

There is posterior/superior lateral dislocation of the left hip with extensive heterotopic ossificati
on. There is foreshortening approximately 9 cm.

 

IMPRESSION: 

Posterolateral dislocation of the left hip with foreshortening approximately 9 cm.  

 

POS: [facility]

## 2018-01-15 PROCEDURE — 0SPS0JZ REMOVAL OF SYNTHETIC SUBSTITUTE FROM LEFT HIP JOINT, FEMORAL SURFACE, OPEN APPROACH: ICD-10-PCS | Performed by: ORTHOPAEDIC SURGERY

## 2018-01-15 RX ADMIN — TROSPIUM CHLORIDE SCH: 20 TABLET, FILM COATED ORAL at 10:55

## 2018-01-15 RX ADMIN — TROSPIUM CHLORIDE SCH MG: 20 TABLET, FILM COATED ORAL at 20:24

## 2018-01-15 RX ADMIN — CLINDAMYCIN PHOSPHATE SCH MLS: 600 INJECTION, SOLUTION INTRAVENOUS at 23:24

## 2018-01-15 RX ADMIN — MORPHINE SULFATE PRN MG: 4 INJECTION, SOLUTION INTRAMUSCULAR; INTRAVENOUS at 06:51

## 2018-01-15 RX ADMIN — CLINDAMYCIN PHOSPHATE SCH MLS: 600 INJECTION, SOLUTION INTRAVENOUS at 16:52

## 2018-01-15 RX ADMIN — ASPIRIN SCH: 81 TABLET ORAL at 10:54

## 2018-01-15 RX ADMIN — MORPHINE SULFATE PRN MG: 4 INJECTION, SOLUTION INTRAMUSCULAR; INTRAVENOUS at 15:50

## 2018-01-15 RX ADMIN — Medication SCH: at 10:54

## 2018-01-15 NOTE — RAD
LEFT HIP 2 VIEWS:

 

HISTORY: 

Postop total hip.

 

COMPARISON: 

Prior day.

 

FINDINGS: 

Interval resection of the left hip femoral arthroplasty.  The intramedullary nail is still in situ.

 

IMPRESSION: 

Removal of the left femoral head arthroplasty with retained femoral nail and cerclage wire.

 

POS: [facility]

## 2018-01-15 NOTE — OP
DATE OF SURGERY:  01/15/2018

 

PREOPERATIVE DIAGNOSIS:  Failed left hip hemiarthroplasty with proximal femur fracture dislocation.

 

POSTOPERATIVE DIAGNOSIS:  Failed left hip hemiarthroplasty with proximal femur fracture dislocation.

 

SURGICAL PROCEDURE:  Removal of left hip bipolar head and proximal body prosthesis.

 

ANESTHESIA:  General.

 

SURGEON:  Joe Degroot M.D.

 

ASSISTANT:  Daniel Roman PA-C.

 

BLOOD LOSS:  Less than 50 mL.

 

SPECIMEN:  Explanted bipolar head and proximal body component discarded.

 

DRAINS:  None.

 

IMPLANTS:  None.

 

COMPLICATIONS:  Failed left hip hemiarthroplasty.

 

OUTCOME:  Satisfactory.

 

INDICATIONS:  The patient is a pleasant 83-year-old lady who does have multiple medical problems incl
uding Parkinson's disease.  The patient is status post femoral neck fracture treated with hemiarthrop
lasty which unfortunately went on to a fracture.  This periprosthetic fracture was revised to a modul
ar stem; however, she has again fractured and dislocated the hip.  After careful consideration and a 
thorough discussion with family, we have decided to proceed with excision of the bipolar head and pro
ximal body leaving her with essentially a Girdlestone type hip in hopes of providing comfort.  Consen
t has been obtained.

 

PROCEDURE:  The patient was brought to the operating room and a timeout performed followed by injecti
on of general anesthesia.  The patient was then positioned in right lateral decubitus position and a 
sterile prep and drape was performed of the left lower extremity.  A curvilinear incision was made fo
llowing the scar from her previous surgeries.  After skin was sharply incised, dissection was carried
 down with electrocautery to the underlying tensor fascia and fascia april.  This structure was incise
d in line with the skin incision and at this point the bipolar head could be clearly visualized.  The
 bipolar head was removed and then the engagement screw from the proximal body was also disengaged.  
This was then followed by the removal jig for the proximal body without difficulty.  The proximal bod
y was then removed.  The stem was found to be buried within bone and not felt to be at risk for causi
ng patient any pain as it was below the surface of the bone.  The cables that had slid also appeared 
to be well incarcerated within scar and it was not felt to be in the patient's best interest to furth
er proceed with more intensive dissection given that the goal of removal of the head had been accompl
ished.  As such, the wound was irrigated with Pulsavac with  antibiotic irrigant added and then jonathan
sed in layers with #1 Vicryl for the deep pseudocapsular layer followed by #1 Vicryl for the tensor f
ascia and fascia april and then 2-0 Vicryl and staples for the skin.  A Xeroform gauze and tape dressi
ng was applied to the thigh and then patient was transferred to recovery room in stable condition.  T
here were no complications.  She tolerated the procedure well.

## 2018-01-16 RX ADMIN — ASPIRIN SCH MG: 81 TABLET ORAL at 09:02

## 2018-01-16 RX ADMIN — Medication SCH TAB: at 09:01

## 2018-01-16 RX ADMIN — MORPHINE SULFATE PRN MG: 4 INJECTION, SOLUTION INTRAMUSCULAR; INTRAVENOUS at 04:04

## 2018-01-16 RX ADMIN — CLINDAMYCIN PHOSPHATE SCH MLS: 600 INJECTION, SOLUTION INTRAVENOUS at 04:02

## 2018-01-16 RX ADMIN — TROSPIUM CHLORIDE SCH MG: 20 TABLET, FILM COATED ORAL at 21:36

## 2018-01-16 RX ADMIN — TROSPIUM CHLORIDE SCH MG: 20 TABLET, FILM COATED ORAL at 09:59

## 2018-01-17 VITALS — TEMPERATURE: 97.7 F | SYSTOLIC BLOOD PRESSURE: 162 MMHG | DIASTOLIC BLOOD PRESSURE: 77 MMHG

## 2018-01-17 RX ADMIN — Medication SCH TAB: at 08:19

## 2018-01-17 RX ADMIN — TROSPIUM CHLORIDE SCH MG: 20 TABLET, FILM COATED ORAL at 10:38

## 2018-01-17 RX ADMIN — ASPIRIN SCH MG: 81 TABLET ORAL at 08:19
